# Patient Record
Sex: FEMALE | Race: WHITE | NOT HISPANIC OR LATINO | Employment: FULL TIME | RURAL
[De-identification: names, ages, dates, MRNs, and addresses within clinical notes are randomized per-mention and may not be internally consistent; named-entity substitution may affect disease eponyms.]

---

## 2018-06-27 ENCOUNTER — OFFICE VISIT (OUTPATIENT)
Dept: OTOLARYNGOLOGY | Facility: CLINIC | Age: 33
End: 2018-06-27

## 2018-06-27 VITALS — WEIGHT: 150 LBS | TEMPERATURE: 98.7 F | BODY MASS INDEX: 29.45 KG/M2 | HEIGHT: 60 IN

## 2018-06-27 DIAGNOSIS — H61.22 CERUMEN DEBRIS ON TYMPANIC MEMBRANE OF LEFT EAR: ICD-10-CM

## 2018-06-27 DIAGNOSIS — M26.622 ARTHRALGIA OF LEFT TEMPOROMANDIBULAR JOINT: Primary | ICD-10-CM

## 2018-06-27 PROCEDURE — 99203 OFFICE O/P NEW LOW 30 MIN: CPT | Performed by: OTOLARYNGOLOGY

## 2018-06-27 PROCEDURE — 69210 REMOVE IMPACTED EAR WAX UNI: CPT | Performed by: OTOLARYNGOLOGY

## 2018-06-27 RX ORDER — HYDROCHLOROTHIAZIDE 25 MG/1
TABLET ORAL
COMMUNITY
End: 2022-08-05

## 2018-06-27 RX ORDER — OFLOXACIN 3 MG/ML
4 SOLUTION AURICULAR (OTIC) 2 TIMES DAILY
Qty: 10 ML | Refills: 0 | Status: SHIPPED | OUTPATIENT
Start: 2018-06-27 | End: 2022-08-05

## 2018-06-27 RX ORDER — NAPROXEN SODIUM 220 MG
220 TABLET ORAL EVERY 12 HOURS PRN
Qty: 20 TABLET | Refills: 0 | Status: SHIPPED | OUTPATIENT
Start: 2018-06-27 | End: 2022-08-05

## 2018-06-27 NOTE — PATIENT INSTRUCTIONS

## 2018-06-27 NOTE — PROGRESS NOTES
Subjective   Dalia Fisher is a 32 y.o. female.   Has soreness in front of her ear she originally thought is a tooth problem on the left side  History of Present Illness     She said she placed Orajel didn't seem to help her pain.  Is not having drainage but she uses Q-tips or she feels like the left ear somewhat stopped up is not having bleeding infection she denies dental trauma or recent dental problems denies jaw injury has not had any recent dental work done  She has popping her jaw she opens and closes she denies gritting her teeth  The following portions of the patient's history were reviewed and updated as appropriate: allergies, current medications, past family history, past medical history, past social history, past surgical history and problem list.      Dalia Fisher reports that she has never smoked. She has never used smokeless tobacco. She reports that she does not drink alcohol or use drugs.  Patient is not a tobacco user and has not been counseled for use of tobacco products    Family History   Problem Relation Age of Onset   • Hypertension Mother    • Diabetes Paternal Grandmother    • Heart disease Paternal Grandfather          Current Outpatient Prescriptions:   •  hydrochlorothiazide (HYDRODIURIL) 25 MG tablet, hydrochlorothiazide 25 mg tablet  Take 1 tablet every day by oral route as directed., Disp: , Rfl:   •  levothyroxine (SYNTHROID, LEVOTHROID) 50 MCG tablet, Take 50 mcg by mouth Daily., Disp: , Rfl:   •  naproxen sodium (ALEVE) 220 MG tablet, Take 1 tablet by mouth Every 12 (Twelve) Hours As Needed for Headache., Disp: 20 tablet, Rfl: 0  •  ofloxacin (FLOXIN) 0.3 % otic solution, Administer 4 drops into the left ear 2 (Two) Times a Day. For 1 week, Disp: 10 mL, Rfl: 0    No Known Allergies    Past Medical History:   Diagnosis Date   • Acid reflux    • Allergic rhinitis    • Anxiety    • Disease of thyroid gland    • Hypertension    • Insomnia    • Otalgia    • Ovarian cyst           Review of Systems   Endocrine: Positive for heat intolerance.   Musculoskeletal: Positive for back pain.   Neurological: Positive for weakness, numbness and headaches.   All other systems reviewed and are negative.  Positive for otalgia and left ears stopped up        Objective   Physical Exam   Constitutional: She is oriented to person, place, and time. She appears well-developed and well-nourished.   HENT:   Head: Normocephalic and atraumatic.   Right Ear: Hearing, external ear and ear canal normal.   Left Ear: Hearing, external ear and ear canal normal.   Ears:    Nose: Nose normal. No mucosal edema, rhinorrhea, nasal deformity or septal deviation. No epistaxis. Right sinus exhibits no maxillary sinus tenderness and no frontal sinus tenderness. Left sinus exhibits no maxillary sinus tenderness and no frontal sinus tenderness.   Mouth/Throat: Uvula is midline, oropharynx is clear and moist and mucous membranes are normal. No trismus in the jaw. Normal dentition. No oropharyngeal exudate or posterior oropharyngeal edema. Tonsils are 3+ on the right. Tonsils are 3+ on the left.   Eyes: Conjunctivae are normal.   Neck: Normal range of motion. Neck supple. No JVD present. No tracheal deviation present. No thyromegaly present.   Pulmonary/Chest: Effort normal.   Musculoskeletal: Normal range of motion.   Lymphadenopathy:        Head (right side): No submental, no submandibular, no tonsillar, no preauricular, no posterior auricular and no occipital adenopathy present.        Head (left side): No submental, no submandibular, no tonsillar, no preauricular, no posterior auricular and no occipital adenopathy present.     She has no cervical adenopathy.        Right cervical: No superficial cervical, no deep cervical and no posterior cervical adenopathy present.       Left cervical: No superficial cervical, no deep cervical and no posterior cervical adenopathy present.   Neurological: She is alert and oriented to  person, place, and time. No cranial nerve deficit.   Skin: Skin is warm.   Psychiatric: She has a normal mood and affect. Her speech is normal. Thought content normal.   Nursing note and vitals reviewed.    She is scalloping of her tongue or 10 suggesting she's gritting her teeth   tender the 8 TMJ  Set removal of wax were not tolerated    Assessment/Plan   Dalia was seen today for earache.    Diagnoses and all orders for this visit:    Arthralgia of left temporomandibular joint    Cerumen debris on tympanic membrane of left ear    Other orders  -     ofloxacin (FLOXIN) 0.3 % otic solution; Administer 4 drops into the left ear 2 (Two) Times a Day. For 1 week  -     naproxen sodium (ALEVE) 220 MG tablet; Take 1 tablet by mouth Every 12 (Twelve) Hours As Needed for Headache.         Discussed TMJ problems just a soft diet not chewing gum.  She may need to see her oral surgeon \  in the meantime of 1 to try and get the wax soft and she's not to use Q-tips because she has irritation from the impacted wax or posterior   Floxin drops she'll begin  him peroxide later date to soften that we'll recheck in 2 weeks she'll keep the ear dry in the meantime    consider audioogram and/or tympanogram on follow-up based on how she is doing after wax removal

## 2022-08-05 ENCOUNTER — OFFICE VISIT (OUTPATIENT)
Dept: FAMILY MEDICINE CLINIC | Facility: CLINIC | Age: 37
End: 2022-08-05

## 2022-08-05 VITALS
WEIGHT: 179 LBS | HEIGHT: 59 IN | HEART RATE: 81 BPM | SYSTOLIC BLOOD PRESSURE: 124 MMHG | BODY MASS INDEX: 36.08 KG/M2 | OXYGEN SATURATION: 97 % | DIASTOLIC BLOOD PRESSURE: 85 MMHG

## 2022-08-05 DIAGNOSIS — Z79.899 MEDICATION MANAGEMENT: ICD-10-CM

## 2022-08-05 DIAGNOSIS — G89.29 CHRONIC RIGHT SHOULDER PAIN: ICD-10-CM

## 2022-08-05 DIAGNOSIS — E03.9 HYPOTHYROIDISM, UNSPECIFIED TYPE: Chronic | ICD-10-CM

## 2022-08-05 DIAGNOSIS — Z11.59 NEED FOR HEPATITIS C SCREENING TEST: ICD-10-CM

## 2022-08-05 DIAGNOSIS — Z13.220 SCREENING FOR LIPID DISORDERS: ICD-10-CM

## 2022-08-05 DIAGNOSIS — M25.511 CHRONIC RIGHT SHOULDER PAIN: ICD-10-CM

## 2022-08-05 DIAGNOSIS — K21.9 GASTROESOPHAGEAL REFLUX DISEASE WITHOUT ESOPHAGITIS: Chronic | ICD-10-CM

## 2022-08-05 DIAGNOSIS — R31.9 HEMATURIA, UNSPECIFIED TYPE: ICD-10-CM

## 2022-08-05 DIAGNOSIS — Z13.0 SCREENING FOR DEFICIENCY ANEMIA: ICD-10-CM

## 2022-08-05 DIAGNOSIS — E04.1 THYROID NODULE: ICD-10-CM

## 2022-08-05 DIAGNOSIS — E66.01 CLASS 2 SEVERE OBESITY DUE TO EXCESS CALORIES WITH SERIOUS COMORBIDITY AND BODY MASS INDEX (BMI) OF 36.0 TO 36.9 IN ADULT: ICD-10-CM

## 2022-08-05 DIAGNOSIS — Z76.89 ESTABLISHING CARE WITH NEW DOCTOR, ENCOUNTER FOR: Primary | ICD-10-CM

## 2022-08-05 LAB
AMPHET+METHAMPHET UR QL: POSITIVE
AMPHETAMINE INTERNAL CONTROL: ABNORMAL
AMPHETAMINES UR QL: NEGATIVE
BARBITURATE INTERNAL CONTROL: ABNORMAL
BARBITURATES UR QL SCN: NEGATIVE
BENZODIAZ UR QL SCN: NEGATIVE
BENZODIAZEPINE INTERNAL CONTROL: ABNORMAL
BILIRUB BLD-MCNC: NEGATIVE MG/DL
BUPRENORPHINE INTERNAL CONTROL: ABNORMAL
BUPRENORPHINE SERPL-MCNC: NEGATIVE NG/ML
CANNABINOIDS SERPL QL: NEGATIVE
CLARITY, POC: CLEAR
COCAINE INTERNAL CONTROL: ABNORMAL
COCAINE UR QL: NEGATIVE
COLOR UR: YELLOW
EXPIRATION DATE: ABNORMAL
EXPIRATION DATE: ABNORMAL
GLUCOSE UR STRIP-MCNC: NEGATIVE MG/DL
KETONES UR QL: NEGATIVE
LEUKOCYTE EST, POC: NEGATIVE
Lab: ABNORMAL
Lab: ABNORMAL
MDMA (ECSTASY) INTERNAL CONTROL: ABNORMAL
MDMA UR QL SCN: NEGATIVE
METHADONE INTERNAL CONTROL: ABNORMAL
METHADONE UR QL SCN: NEGATIVE
METHAMPHETAMINE INTERNAL CONTROL: ABNORMAL
NITRITE UR-MCNC: NEGATIVE MG/ML
OPIATES INTERNAL CONTROL: ABNORMAL
OPIATES UR QL: NEGATIVE
OXYCODONE INTERNAL CONTROL: ABNORMAL
OXYCODONE UR QL SCN: NEGATIVE
PCP UR QL SCN: NEGATIVE
PH UR: 6.5 [PH] (ref 5–8)
PHENCYCLIDINE INTERNAL CONTROL: ABNORMAL
PROT UR STRIP-MCNC: NEGATIVE MG/DL
RBC # UR STRIP: ABNORMAL /UL
SP GR UR: 1.01 (ref 1–1.03)
THC INTERNAL CONTROL: ABNORMAL
UROBILINOGEN UR QL: NORMAL

## 2022-08-05 PROCEDURE — 81003 URINALYSIS AUTO W/O SCOPE: CPT | Performed by: PHYSICIAN ASSISTANT

## 2022-08-05 PROCEDURE — 80305 DRUG TEST PRSMV DIR OPT OBS: CPT | Performed by: PHYSICIAN ASSISTANT

## 2022-08-05 PROCEDURE — 87086 URINE CULTURE/COLONY COUNT: CPT | Performed by: PHYSICIAN ASSISTANT

## 2022-08-05 PROCEDURE — 99204 OFFICE O/P NEW MOD 45 MIN: CPT | Performed by: PHYSICIAN ASSISTANT

## 2022-08-05 RX ORDER — LEVOTHYROXINE SODIUM 0.05 MG/1
50 TABLET ORAL DAILY
Qty: 90 TABLET | Refills: 0 | Status: SHIPPED | OUTPATIENT
Start: 2022-08-05 | End: 2022-11-04 | Stop reason: SDUPTHER

## 2022-08-05 RX ORDER — FAMOTIDINE 20 MG/1
20 TABLET, FILM COATED ORAL
Qty: 30 TABLET | Refills: 0 | Status: SHIPPED | OUTPATIENT
Start: 2022-08-05 | End: 2022-09-19

## 2022-08-05 RX ORDER — GABAPENTIN 300 MG/1
300 CAPSULE ORAL 2 TIMES DAILY
COMMUNITY
End: 2022-08-07 | Stop reason: SDUPTHER

## 2022-08-05 NOTE — PATIENT INSTRUCTIONS
Patient was educated/instructed on their diagnosis, treatment and medications prior to discharge from the clinic today. Patient advised to call the office with any questions or concerns.

## 2022-08-05 NOTE — PROGRESS NOTES
Chief Complaint  Chief Complaint   Patient presents with   • Establish Care   • Hypothyroidism   • chronic pain       Subjective          Dalia Fisher presents to [facility]  History of Present Illness     Patient is here today as a new patient to establish care. Previous PCP is listed as Dr Jodie Moore in [place], last seen in 2022. Patient moved to [place] in June 2022.     Thyroid nodule: Patient had a thyroid ultrasound on 5- which showed a 4.4 mm benign hypoechoic lesion.  Patient states that she was instructed to follow-up in 6 months.    Hypothyroidism: Patient was diagnosed in 2014 with hypothyroidism.  Patient states that she has been out of her levothyroxine 50 MCG daily for about 2 weeks.  Patient states that she has difficulty losing weight.  States that she recently started phentermine on Monday from a weight loss clinic in [facility].  Patient states that she is unsure of how to monitor her nutrition; she states that she does walk on occasion but is in the process of moving and is currently not doing formal exercise.    Chronic right shoulder pain: Patient states that she is on gabapentin 300 mg twice daily for chronic right shoulder pain she states that this works very well for her however has been out for the past 2 weeks.  She had an MRI of the cervical spine on December 23, 2021.  That test has been reviewed today.  It shows minimal bulging of the disc at C6-7 without spinal stenosis or foraminal narrowing.    GERD: Patient states that she only takes Tums as needed for GERD however she has noted neck fullness and a raspy voice noted in the morning with occasional sore throat.    Hematuria: Patient states that during a recent work physical it was noted that she had hematuria with no further work-up.  She would like to have it checked again today.  She has no urinary symptoms.    Patient states that she would like to get back on two medications,  "Levothyroxine and Gabapentin.     Medical History: has a past medical history of Acid reflux, Allergic rhinitis, Anxiety, Disease of thyroid gland, Hypertension, Insomnia, Otalgia, and Ovarian cyst.   Surgical History: has a past surgical history that includes  section; Carpal tunnel release; and Ectopic pregnancy.   Family History: family history includes Diabetes in her paternal grandmother; Heart disease in her paternal grandfather; Hypertension in her mother.   Social History: reports that she has never smoked. She has never used smokeless tobacco. She reports that she does not drink alcohol and does not use drugs.    Allergies: Patient has no known allergies.    Health Maintenance Due   Topic Date Due   • ANNUAL PHYSICAL  Never done   • HEPATITIS C SCREENING  Never done       Immunization History   Administered Date(s) Administered   • COVID-19 (PFIZER) PURPLE CAP 2021, 2021   • FluLaval/Fluarix/Fluzone >6 12/15/2017       Objective     Vitals:    22 0917   BP: 124/85   Pulse: 81   SpO2: 97%   Weight: 81.2 kg (179 lb)   Height: 149.9 cm (59\")     Body mass index is 36.15 kg/m².     Wt Readings from Last 3 Encounters:   22 81.2 kg (179 lb)   18 68 kg (150 lb)     BP Readings from Last 3 Encounters:   22 124/85       Class 2 Severe Obesity (BMI >=35 and <=39.9). Obesity-related health conditions include the following: GERD. Obesity is newly identified. BMI is is above average; BMI management plan is completed. We discussed portion control and increasing exercise.      Patient Care Team:  Jelena Melara PA as PCP - General (Family Medicine)    Physical Exam  Vitals and nursing note reviewed.   Constitutional:       Appearance: Normal appearance. She is obese.   HENT:      Head: Normocephalic and atraumatic.   Neck:      Vascular: No carotid bruit.      Comments: Thyroid : gland size normal, nontender, no nodules or masses present on palpation   Cardiovascular: "      Rate and Rhythm: Normal rate and regular rhythm.      Pulses: Normal pulses.      Heart sounds: Normal heart sounds.   Pulmonary:      Effort: Pulmonary effort is normal.      Breath sounds: Normal breath sounds.   Musculoskeletal:      Cervical back: Neck supple. No tenderness.      Right lower leg: No edema.      Left lower leg: No edema.   Lymphadenopathy:      Cervical: No cervical adenopathy.   Neurological:      Mental Status: She is alert.   Psychiatric:         Mood and Affect: Mood normal.         Behavior: Behavior normal.           Result Review :                           Assessment and Plan      Diagnoses and all orders for this visit:    1. Establishing care with new doctor, encounter for (Primary)    2. Hypothyroidism, unspecified type  Comments:  Unsure of stability: Will restart levothyroxine at 50 MCG daily and recheck labs in about a month.  Orders:  -     levothyroxine (SYNTHROID, LEVOTHROID) 50 MCG tablet; Take 1 tablet by mouth Daily.  Dispense: 90 tablet; Refill: 0  -     TSH; Future  -     T4, Free; Future    3. Gastroesophageal reflux disease without esophagitis  Comments:  Not stable: We will start Pepcid 20 mg once daily with evening meal for about a month to see if symptoms resolve.  Orders:  -     famotidine (Pepcid) 20 MG tablet; Take 1 tablet by mouth Daily Before Supper.  Dispense: 30 tablet; Refill: 0    4. Need for hepatitis C screening test  -     Hepatitis C Antibody; Future    5. Screening for lipid disorders  -     Comprehensive Metabolic Panel; Future  -     Lipid Panel; Future    6. Screening for deficiency anemia  -     CBC & Differential; Future    7. Medication management  Comments:  Mario JULIAN obtained and reviewed.  Narcotic consent signed.  Orders:  -     POC Urine Drug Screen [facility]    8. Hematuria, unspecified type  Comments:  We will check UA to further evaluate.  Orders:  -     POCT urinalysis dipstick, automated  -     Urine Culture - Urine, Urine,  Clean Catch; Future  -     Urine Culture - Urine, Urine, Clean Catch    9. Class 2 severe obesity due to excess calories with serious comorbidity and body mass index (BMI) of 36.0 to 36.9 in adult (HCC)  Comments:  Discussed diet and nutrition and made an appt to discuss nutrition further.    10. Thyroid nodule  Comments:  Patient to have recheck thyroid u/s in 12 mths from orginal.    11. Chronic right shoulder pain  Comments:  Start gabapentin 300 mg twice daily.  UDS plan #for review.  Controlled medication consent signed by patient.  Orders:  -     gabapentin (NEURONTIN) 300 MG capsule; Take 1 capsule by mouth 2 (Two) Times a Day.  Dispense: 60 capsule; Refill: 2            Follow Up     Return in about 3 months (around 11/5/2022).    Patient was given instructions and counseling regarding her condition or for health maintenance advice. Please see specific information pulled into the AVS if appropriate.

## 2022-08-06 LAB — BACTERIA SPEC AEROBE CULT: NO GROWTH

## 2022-08-07 RX ORDER — GABAPENTIN 300 MG/1
300 CAPSULE ORAL 2 TIMES DAILY
Qty: 60 CAPSULE | Refills: 2 | Status: SHIPPED | OUTPATIENT
Start: 2022-08-07 | End: 2023-03-17 | Stop reason: SDUPTHER

## 2022-08-19 ENCOUNTER — PATIENT ROUNDING (BHMG ONLY) (OUTPATIENT)
Dept: FAMILY MEDICINE CLINIC | Facility: CLINIC | Age: 37
End: 2022-08-19

## 2022-08-19 NOTE — PROGRESS NOTES
A [employer] message has been sent to the patient for PATIENT ROUNDING with [facility].

## 2022-09-16 DIAGNOSIS — K21.9 GASTROESOPHAGEAL REFLUX DISEASE WITHOUT ESOPHAGITIS: Chronic | ICD-10-CM

## 2022-09-19 RX ORDER — FAMOTIDINE 20 MG/1
TABLET, FILM COATED ORAL
Qty: 30 TABLET | Refills: 0 | Status: SHIPPED | OUTPATIENT
Start: 2022-09-19 | End: 2022-11-04 | Stop reason: SDUPTHER

## 2022-09-23 ENCOUNTER — OFFICE VISIT (OUTPATIENT)
Dept: FAMILY MEDICINE CLINIC | Facility: CLINIC | Age: 37
End: 2022-09-23

## 2022-09-23 VITALS
DIASTOLIC BLOOD PRESSURE: 82 MMHG | SYSTOLIC BLOOD PRESSURE: 120 MMHG | WEIGHT: 179.2 LBS | HEIGHT: 59 IN | BODY MASS INDEX: 36.12 KG/M2 | HEART RATE: 81 BPM | OXYGEN SATURATION: 100 % | TEMPERATURE: 97.9 F

## 2022-09-23 DIAGNOSIS — E03.9 HYPOTHYROIDISM, UNSPECIFIED TYPE: ICD-10-CM

## 2022-09-23 DIAGNOSIS — Z76.89 ESTABLISHING CARE WITH NEW DOCTOR, ENCOUNTER FOR: Primary | ICD-10-CM

## 2022-09-23 DIAGNOSIS — M25.511 CHRONIC RIGHT SHOULDER PAIN: ICD-10-CM

## 2022-09-23 DIAGNOSIS — K21.9 GASTROESOPHAGEAL REFLUX DISEASE WITHOUT ESOPHAGITIS: ICD-10-CM

## 2022-09-23 DIAGNOSIS — Z11.59 NEED FOR HEPATITIS C SCREENING TEST: ICD-10-CM

## 2022-09-23 DIAGNOSIS — G89.29 CHRONIC RIGHT SHOULDER PAIN: ICD-10-CM

## 2022-09-23 DIAGNOSIS — S14.3XXS INJURY OF BRACHIAL PLEXUS, SEQUELA: ICD-10-CM

## 2022-09-23 DIAGNOSIS — E66.01 CLASS 2 SEVERE OBESITY DUE TO EXCESS CALORIES WITH SERIOUS COMORBIDITY AND BODY MASS INDEX (BMI) OF 36.0 TO 36.9 IN ADULT: ICD-10-CM

## 2022-09-23 DIAGNOSIS — Z23 NEED FOR INFLUENZA VACCINATION: ICD-10-CM

## 2022-09-23 PROBLEM — E66.812 CLASS 2 SEVERE OBESITY DUE TO EXCESS CALORIES WITH SERIOUS COMORBIDITY AND BODY MASS INDEX (BMI) OF 36.0 TO 36.9 IN ADULT: Status: ACTIVE | Noted: 2022-09-23

## 2022-09-23 LAB
ALBUMIN SERPL-MCNC: 4.9 G/DL (ref 3.5–5.2)
ALBUMIN/GLOB SERPL: 1.9 G/DL
ALP SERPL-CCNC: 84 U/L (ref 39–117)
ALT SERPL W P-5'-P-CCNC: 29 U/L (ref 1–33)
ANION GAP SERPL CALCULATED.3IONS-SCNC: 10 MMOL/L (ref 5–15)
AST SERPL-CCNC: 26 U/L (ref 1–32)
BASOPHILS # BLD AUTO: 0.03 10*3/MM3 (ref 0–0.2)
BASOPHILS NFR BLD AUTO: 0.3 % (ref 0–1.5)
BILIRUB SERPL-MCNC: 0.4 MG/DL (ref 0–1.2)
BUN SERPL-MCNC: 14 MG/DL (ref 6–20)
BUN/CREAT SERPL: 23.7 (ref 7–25)
CALCIUM SPEC-SCNC: 9.8 MG/DL (ref 8.6–10.5)
CHLORIDE SERPL-SCNC: 102 MMOL/L (ref 98–107)
CHOLEST SERPL-MCNC: 240 MG/DL (ref 0–200)
CO2 SERPL-SCNC: 24 MMOL/L (ref 22–29)
CREAT SERPL-MCNC: 0.59 MG/DL (ref 0.57–1)
DEPRECATED RDW RBC AUTO: 43.1 FL (ref 37–54)
EGFRCR SERPLBLD CKD-EPI 2021: 118.5 ML/MIN/1.73
EOSINOPHIL # BLD AUTO: 0.19 10*3/MM3 (ref 0–0.4)
EOSINOPHIL NFR BLD AUTO: 2.1 % (ref 0.3–6.2)
ERYTHROCYTE [DISTWIDTH] IN BLOOD BY AUTOMATED COUNT: 14.3 % (ref 12.3–15.4)
GLOBULIN UR ELPH-MCNC: 2.6 GM/DL
GLUCOSE SERPL-MCNC: 79 MG/DL (ref 65–99)
HCT VFR BLD AUTO: 41.4 % (ref 34–46.6)
HCV AB SER DONR QL: NORMAL
HDLC SERPL-MCNC: 46 MG/DL (ref 40–60)
HGB BLD-MCNC: 13.5 G/DL (ref 12–15.9)
IMM GRANULOCYTES # BLD AUTO: 0.03 10*3/MM3 (ref 0–0.05)
IMM GRANULOCYTES NFR BLD AUTO: 0.3 % (ref 0–0.5)
LDLC SERPL CALC-MCNC: 166 MG/DL (ref 0–100)
LDLC/HDLC SERPL: 3.56 {RATIO}
LYMPHOCYTES # BLD AUTO: 2.26 10*3/MM3 (ref 0.7–3.1)
LYMPHOCYTES NFR BLD AUTO: 24.8 % (ref 19.6–45.3)
MCH RBC QN AUTO: 27.4 PG (ref 26.6–33)
MCHC RBC AUTO-ENTMCNC: 32.6 G/DL (ref 31.5–35.7)
MCV RBC AUTO: 84 FL (ref 79–97)
MONOCYTES # BLD AUTO: 0.64 10*3/MM3 (ref 0.1–0.9)
MONOCYTES NFR BLD AUTO: 7 % (ref 5–12)
NEUTROPHILS NFR BLD AUTO: 5.97 10*3/MM3 (ref 1.7–7)
NEUTROPHILS NFR BLD AUTO: 65.5 % (ref 42.7–76)
NRBC BLD AUTO-RTO: 0 /100 WBC (ref 0–0.2)
PLATELET # BLD AUTO: 386 10*3/MM3 (ref 140–450)
PMV BLD AUTO: 9.8 FL (ref 6–12)
POTASSIUM SERPL-SCNC: 3.8 MMOL/L (ref 3.5–5.2)
PROT SERPL-MCNC: 7.5 G/DL (ref 6–8.5)
RBC # BLD AUTO: 4.93 10*6/MM3 (ref 3.77–5.28)
SODIUM SERPL-SCNC: 136 MMOL/L (ref 136–145)
T-UPTAKE NFR SERPL: 1.01 TBI (ref 0.8–1.3)
T4 SERPL-MCNC: 9.57 MCG/DL (ref 4.5–11.7)
TRIGL SERPL-MCNC: 152 MG/DL (ref 0–150)
TSH SERPL DL<=0.05 MIU/L-ACNC: 4.49 UIU/ML (ref 0.27–4.2)
VLDLC SERPL-MCNC: 28 MG/DL (ref 5–40)
WBC NRBC COR # BLD: 9.12 10*3/MM3 (ref 3.4–10.8)

## 2022-09-23 PROCEDURE — 86803 HEPATITIS C AB TEST: CPT

## 2022-09-23 PROCEDURE — 84479 ASSAY OF THYROID (T3 OR T4): CPT

## 2022-09-23 PROCEDURE — 80050 GENERAL HEALTH PANEL: CPT

## 2022-09-23 PROCEDURE — 84436 ASSAY OF TOTAL THYROXINE: CPT

## 2022-09-23 PROCEDURE — 99204 OFFICE O/P NEW MOD 45 MIN: CPT

## 2022-09-23 PROCEDURE — 80061 LIPID PANEL: CPT

## 2022-09-23 NOTE — PROGRESS NOTES
Chief Complaint  Chief Complaint   Patient presents with   • Establish Care   • Shoulder Pain       Subjective          Dalia NINA presents to [facility]  History of Present Illness  Patient presents today to establish care.  She had recently seen a PCP in the [facility] area but she prefers to have a provider closer to home in [place].  When she last saw this provider a little over a month ago lab work was ordered to be done in the future after she had restarted her levothyroxine. This lab work was never done, so it will be completed this visit.    Patient moved here from [place] within the last year or so.  She states that she had an injury to her brachial plexus in November 2021 resulting in chronic right shoulder pain.  She was seeing a neurologist in [place] who was providing treatments. She would like to see someone locally to continue these treatments to help her with this ongoing issue. She continues to have pain and decreased strength to right arm. Currently takes Gabapentin 300 mg, uses massage gun at home. She had an MRI of the cervical spine on December 23, 2021.  It shows minimal bulging of the disc at C6-7 without spinal stenosis or foraminal narrowing.    Patient reports frustration with 20 lb weight gain since delivery of her youngest son in 2019.  She has tried losing weight through diet and exercise but has not been successful.  She does have a history of thyroid disorder and very recently began taking levothyroxine again. Routine labwork is due.     Patient had a thyroid ultrasound on 5- which showed a 4.4 mm benign hypoechoic lesion.  Patient states that she was instructed to follow-up in 6 months. She will need a repeat thyroid ultrasound.    Objective     Medical History:  Past Medical History:   Diagnosis Date   • Acid reflux    • Allergic rhinitis    • Anxiety    • Disease of thyroid gland    • Hypertension    • Insomnia    • Otalgia    • Ovarian cyst   "    Past Surgical History:   Procedure Laterality Date   • CARPAL TUNNEL RELEASE     •  SECTION     • ECTOPIC PREGNANCY        Social History     Tobacco Use   • Smoking status: Never Smoker   • Smokeless tobacco: Never Used   Vaping Use   • Vaping Use: Never used   Substance Use Topics   • Alcohol use: No   • Drug use: No     Family History   Problem Relation Age of Onset   • Hypertension Mother    • Diabetes Paternal Grandmother    • Heart disease Paternal Grandfather        Medications:  Prior to Admission medications    Medication Sig Start Date End Date Taking? Authorizing Provider   diclofenac (VOLTAREN) 75 MG EC tablet Take 1 tablet by mouth 2 (Two) Times a Day. 9/10/22  Yes Bambi Muñoz MD   famotidine (PEPCID) 20 MG tablet TAKE 1 TABLET BY MOUTH ONCE DAILY BEFORE SUPPER 22  Yes Jelena Melara PA   gabapentin (NEURONTIN) 300 MG capsule Take 1 capsule by mouth 2 (Two) Times a Day. 22  Yes Jelena Melara PA   levothyroxine (SYNTHROID, LEVOTHROID) 50 MCG tablet Take 1 tablet by mouth Daily. 22  Yes Jelena Melara PA   methocarbamol (ROBAXIN) 500 MG tablet Take 2 tablets by mouth 4 (Four) Times a Day. 9/10/22   Bambi Muñoz MD   predniSONE (DELTASONE) 20 MG tablet Take 2 tablets by mouth Daily. 9/10/22 9/23/22  Bambi Muñoz MD        Allergies:   Patient has no known allergies.    Health Maintenance Due   Topic Date Due   • ANNUAL PHYSICAL  Never done   • HEPATITIS C SCREENING  Never done   • COVID-19 Vaccine (3 - Booster for Pfizer series) 10/11/2021           Vital Signs:     /82   Pulse 81   Temp 97.9 °F (36.6 °C)   Ht 149.9 cm (59\")   Wt 81.3 kg (179 lb 3.2 oz)   SpO2 100%   BMI 36.19 kg/m²       Physical Exam  Vitals reviewed.   Constitutional:       Appearance: Normal appearance. She is well-developed. She is obese.   HENT:      Head: Normocephalic and atraumatic.      Right Ear: External ear normal.      Left Ear: External ear normal.    "   Mouth/Throat:      Pharynx: No oropharyngeal exudate.   Eyes:      Conjunctiva/sclera: Conjunctivae normal.      Pupils: Pupils are equal, round, and reactive to light.   Cardiovascular:      Rate and Rhythm: Normal rate and regular rhythm.      Heart sounds: No murmur heard.    No friction rub. No gallop.   Pulmonary:      Effort: Pulmonary effort is normal.      Breath sounds: Normal breath sounds. No wheezing or rhonchi.   Abdominal:      General: Bowel sounds are normal. There is no distension.      Palpations: Abdomen is soft.      Tenderness: There is no abdominal tenderness.   Musculoskeletal:      Right shoulder: Tenderness present. Decreased range of motion. Decreased strength.      Right upper arm: Tenderness present.   Skin:     General: Skin is warm and dry.   Neurological:      Mental Status: She is alert and oriented to person, place, and time.      Cranial Nerves: No cranial nerve deficit.   Psychiatric:         Mood and Affect: Mood and affect normal.         Behavior: Behavior normal.         Thought Content: Thought content normal.         Judgment: Judgment normal.          Result Review :                   Assessment and Plan    Diagnoses and all orders for this visit:    1. Establishing care with new doctor, encounter for (Primary)  -     CBC & Differential  -     Comprehensive Metabolic Panel  -     Lipid Panel    2. Chronic right shoulder pain  -     Cancel: Ambulatory Referral to Neurology  -     Ambulatory Referral to Physical Therapy Evaluate and treat; Electrotherapy    3. Class 2 severe obesity due to excess calories with serious comorbidity and body mass index (BMI) of 36.0 to 36.9 in adult (HCC)  Assessment & Plan:  Patient's (Body mass index is 36.19 kg/m².) indicates that they are obese (BMI >30) with health conditions that include none . Weight is newly identified. BMI is is above average; BMI management plan is completed. We discussed portion control and increasing exercise.        4. Gastroesophageal reflux disease without esophagitis  Assessment & Plan:  Continue taking Pepcid 20 mg daily      5. Need for hepatitis C screening test  -     Hepatitis C antibody    6. Need for influenza vaccination  -     FluLaval/Fluzone >6 mos ([id number])    7. Injury of brachial plexus, sequela  -     Ambulatory Referral to Physical Therapy Evaluate and treat; Electrotherapy    8. Hypothyroidism, unspecified type  -     Thyroid Panel With TSH     \plain    Patient will be referred to physical therapy for evaluation and treatment for chronic shoulder pain. She may be referred to pain management for continuation of Gabapentin. Thyroid ultrasound will be due to be repeated after November 2022. Encouraged patient to rule out any underlying cause of inability to lose weight prior to beginning any new medications for weight loss.     Smoking Cessation:    Dalia NINA  reports that she has never smoked. She has never used smokeless tobacco.          Follow Up   Return in about 3 months (around 12/23/2022) for Recheck.  Patient was given instructions and counseling regarding her condition or for health maintenance advice. Please see specific information pulled into the AVS if appropriate.

## 2022-09-23 NOTE — ASSESSMENT & PLAN NOTE
Patient's (Body mass index is 36.19 kg/m².) indicates that they are obese (BMI >30) with health conditions that include none . Weight is newly identified. BMI is is above average; BMI management plan is completed. We discussed portion control and increasing exercise.

## 2022-09-26 DIAGNOSIS — Z79.899 MEDICATION MANAGEMENT: ICD-10-CM

## 2022-09-26 DIAGNOSIS — S14.3XXS INJURY OF BRACHIAL PLEXUS, SEQUELA: ICD-10-CM

## 2022-09-26 DIAGNOSIS — G89.29 CHRONIC RIGHT SHOULDER PAIN: Primary | ICD-10-CM

## 2022-09-26 DIAGNOSIS — M25.511 CHRONIC RIGHT SHOULDER PAIN: Primary | ICD-10-CM

## 2022-09-27 ENCOUNTER — TELEPHONE (OUTPATIENT)
Dept: FAMILY MEDICINE CLINIC | Facility: CLINIC | Age: 37
End: 2022-09-27

## 2022-09-27 NOTE — TELEPHONE ENCOUNTER
Caller: Dalia NINA    Relationship: Self    Best call back number: [phone]    Caller requesting test results: PATIENT    What test was performed:LABS    When was the test performed: FRIDAY    Where was the test performed: IN OFFICE    Additional notes: PATIENT WOULD LIKE TO KNOW THE RESULTS OF HER LABS.

## 2022-09-28 NOTE — TELEPHONE ENCOUNTER
Caller: Dalia NINA    Relationship: Self    Best call back number: [phone]     What is the best time to reach you: ANY    Who are you requesting to speak with (clinical staff, provider,  specific staff member): CLINICAL STAFF    What was the call regarding: PATIENT CALLED BACK REGARDING HER TEST RESULTS. SHE WOULD LIKE TO SPEAK TO A NURSE ABOUT THOSE RESULTS.    Do you require a callback: YES

## 2022-11-04 ENCOUNTER — OFFICE VISIT (OUTPATIENT)
Dept: FAMILY MEDICINE CLINIC | Facility: CLINIC | Age: 37
End: 2022-11-04

## 2022-11-04 VITALS
TEMPERATURE: 97.4 F | BODY MASS INDEX: 36 KG/M2 | SYSTOLIC BLOOD PRESSURE: 132 MMHG | HEIGHT: 59 IN | OXYGEN SATURATION: 99 % | DIASTOLIC BLOOD PRESSURE: 94 MMHG | HEART RATE: 96 BPM | WEIGHT: 178.6 LBS

## 2022-11-04 DIAGNOSIS — Z23 NEED FOR INFLUENZA VACCINATION: ICD-10-CM

## 2022-11-04 DIAGNOSIS — E66.01 CLASS 2 SEVERE OBESITY DUE TO EXCESS CALORIES WITH SERIOUS COMORBIDITY AND BODY MASS INDEX (BMI) OF 36.0 TO 36.9 IN ADULT: ICD-10-CM

## 2022-11-04 DIAGNOSIS — K21.9 GASTROESOPHAGEAL REFLUX DISEASE WITHOUT ESOPHAGITIS: Chronic | ICD-10-CM

## 2022-11-04 DIAGNOSIS — Z86.39 HX OF THYROID NODULE: Primary | ICD-10-CM

## 2022-11-04 DIAGNOSIS — E03.9 HYPOTHYROIDISM, UNSPECIFIED TYPE: Chronic | ICD-10-CM

## 2022-11-04 PROCEDURE — 90471 IMMUNIZATION ADMIN: CPT

## 2022-11-04 PROCEDURE — 90686 IIV4 VACC NO PRSV 0.5 ML IM: CPT

## 2022-11-04 PROCEDURE — 99213 OFFICE O/P EST LOW 20 MIN: CPT

## 2022-11-04 RX ORDER — FAMOTIDINE 20 MG/1
20 TABLET, FILM COATED ORAL DAILY
Qty: 30 TABLET | Refills: 2 | Status: SHIPPED | OUTPATIENT
Start: 2022-11-04 | End: 2023-01-30

## 2022-11-04 RX ORDER — LEVOTHYROXINE SODIUM 0.05 MG/1
50 TABLET ORAL DAILY
Qty: 90 TABLET | Refills: 0 | Status: SHIPPED | OUTPATIENT
Start: 2022-11-04 | End: 2023-01-30

## 2022-11-04 NOTE — ASSESSMENT & PLAN NOTE
Addended by: LAILA CHAPPELL on: 11/1/2019 05:20 PM     Modules accepted: Orders     Patient's (Body mass index is 36.07 kg/m².) indicates that they are obese (BMI >30) with health conditions that include none . Weight is improving with lifestyle modifications. BMI is is above average; BMI management plan is completed. We discussed portion control, increasing exercise, an emily-based approach such as Nantero Pal or Lose It and Referred to weight loss clinic.

## 2022-11-04 NOTE — PROGRESS NOTES
Chief Complaint  Chief Complaint   Patient presents with   • Weight Loss   • Med Refill     Levothyroxine   Pepcid        Subjective      Dalia NINA presents to [facility]  History of Present Illness  Patient presents today to discuss weight loss.  She has been trying to follow a healthy diet with increased fresh fruits and vegetables as well as increasing her water intake.  She has cut out all sodas.  She has  been exercising and trying to be more physically active than previously.  She feels that she is having difficulty losing weight despite all these changes.  She has lost 7 pounds since she was last seen in September.    She had previously been off of her thyroid medication and was restarted on levothyroxine 50 mcg daily.  She reports that she has been taking that regularly.  Labs will be rechecked at her next appointment on 2022.    Patient has a history of thyroid nodules which were found via thyroid ultrasound in May of this year.  They advised that she have the ultrasound repeated in 6 months to monitor the nodules for any changes or growth in size.  She does report a family history of thyroid disease and a cousin who had thyroid cancer and subsequent thyroidectomy.    Objective     Medical History:  Past Medical History:   Diagnosis Date   • Acid reflux    • Allergic rhinitis    • Anxiety    • Disease of thyroid gland    • Hypertension    • Insomnia    • Otalgia    • Ovarian cyst      Past Surgical History:   Procedure Laterality Date   • CARPAL TUNNEL RELEASE     •  SECTION     • ECTOPIC PREGNANCY        Social History     Tobacco Use   • Smoking status: Never   • Smokeless tobacco: Never   Vaping Use   • Vaping Use: Never used   Substance Use Topics   • Alcohol use: No   • Drug use: No     Family History   Problem Relation Age of Onset   • Hypertension Mother    • Diabetes Paternal Grandmother    • Heart disease Paternal Grandfather   "      Medications:  Prior to Admission medications    Medication Sig Start Date End Date Taking? Authorizing Provider   famotidine (PEPCID) 20 MG tablet TAKE 1 TABLET BY MOUTH ONCE DAILY BEFORE SUPPER 9/19/22  Yes Jelena Melara PA   gabapentin (NEURONTIN) 300 MG capsule Take 1 capsule by mouth 2 (Two) Times a Day. 8/7/22  Yes Jelena Melara PA   levothyroxine (SYNTHROID, LEVOTHROID) 50 MCG tablet Take 1 tablet by mouth Daily. 8/5/22  Yes Jelena Melara PA   diclofenac (VOLTAREN) 75 MG EC tablet Take 1 tablet by mouth 2 (Two) Times a Day. 9/10/22   Bambi Muñoz MD        Allergies:   Patient has no known allergies.    Health Maintenance Due   Topic Date Due   • ANNUAL PHYSICAL  Never done   • COVID-19 Vaccine (3 - Booster for Pfizer series) 10/11/2021         Vital Signs:   /94   Pulse 96   Temp 97.4 °F (36.3 °C)   Ht 149.9 cm (59\")   Wt 81 kg (178 lb 9.6 oz)   SpO2 99%   BMI 36.07 kg/m²     Wt Readings from Last 3 Encounters:   11/04/22 81 kg (178 lb 9.6 oz)   09/23/22 81.3 kg (179 lb 3.2 oz)   09/10/22 83.9 kg (185 lb)     BP Readings from Last 3 Encounters:   11/04/22 132/94   09/23/22 120/82   09/10/22 148/97       Class 2 Severe Obesity (BMI >=35 and <=39.9). Obesity-related health conditions include the following: none. Obesity is improving with lifestyle modifications. BMI is is above average; BMI management plan is completed. We discussed portion control, increasing exercise and an emily-based approach such as LCO Creation Pal or Lose It.       Physical Exam  Vitals reviewed.   Constitutional:       Appearance: Normal appearance. She is well-developed. She is obese.   HENT:      Head: Normocephalic and atraumatic.      Right Ear: External ear normal.      Left Ear: External ear normal.      Mouth/Throat:      Pharynx: No oropharyngeal exudate.   Eyes:      Conjunctiva/sclera: Conjunctivae normal.      Pupils: Pupils are equal, round, and reactive to light. "   Cardiovascular:      Rate and Rhythm: Normal rate and regular rhythm.      Heart sounds: No murmur heard.    No friction rub. No gallop.   Pulmonary:      Effort: Pulmonary effort is normal.      Breath sounds: Normal breath sounds. No wheezing or rhonchi.   Abdominal:      General: Bowel sounds are normal. There is no distension.      Palpations: Abdomen is soft.      Tenderness: There is no abdominal tenderness.   Skin:     General: Skin is warm and dry.   Neurological:      Mental Status: She is alert and oriented to person, place, and time.      Cranial Nerves: No cranial nerve deficit.   Psychiatric:         Mood and Affect: Mood and affect normal.         Behavior: Behavior normal.         Thought Content: Thought content normal.         Judgment: Judgment normal.          Result Review :    The following data was reviewed by SUSANNE Cline on 11/04/22 at 16:51 EDT:    Common labs    Common Labs 9/23/22 9/23/22 9/23/22    1357 1357 1357   Glucose  79    BUN  14    Creatinine  0.59    Sodium  136    Potassium  3.8    Chloride  102    Calcium  9.8    Albumin  4.90    Total Bilirubin  0.4    Alkaline Phosphatase  84    AST (SGOT)  26    ALT (SGPT)  29    WBC 9.12     Hemoglobin 13.5     Hematocrit 41.4     Platelets 386     Total Cholesterol   240 (A)   Triglycerides   152 (A)   HDL Cholesterol   46   LDL Cholesterol    166 (A)   (A) Abnormal value            TSH    TSH 9/23/22   TSH 4.490 (A)   (A) Abnormal value              No Images in the past 120 days found..    Data reviewed: Radiologic studies Reviewed scanned thyroid ultrasound from previous provider that was done in May 2022 with suggestion of repeating in 6 months.              Assessment and Plan    Diagnoses and all orders for this visit:    1. Hx of thyroid nodule (Primary)  -     US Thyroid    2. Gastroesophageal reflux disease without esophagitis  Comments:  Continue Pepcid 20 mg once daily.  Orders:  -     famotidine (PEPCID) 20 MG  tablet; Take 1 tablet by mouth Daily.  Dispense: 30 tablet; Refill: 2    3. Hypothyroidism, unspecified type  Comments:  Continue levothyroxine at 50 MCG daily and will recheck labs in about a month at her next visit.  Orders:  -     levothyroxine (SYNTHROID, LEVOTHROID) 50 MCG tablet; Take 1 tablet by mouth Daily.  Dispense: 90 tablet; Refill: 0    4. Need for influenza vaccination  -     FluLaval/Fluzone >6 mos (9794-3035)    5. Class 2 severe obesity due to excess calories with serious comorbidity and body mass index (BMI) of 36.0 to 36.9 in adult (HCC)  Assessment & Plan:  Patient's (Body mass index is 36.07 kg/m².) indicates that they are obese (BMI >30) with health conditions that include none . Weight is improving with lifestyle modifications. BMI is is above average; BMI management plan is completed. We discussed portion control, increasing exercise, an emily-based approach such as MyFitness Pal or Lose It and Referred to weight loss clinic.        Patient will have thyroid ultrasound repeated to monitor her thyroid nodules for any growth or changes.  She was referred to local weight loss clinics if she chooses to pursue medication or assisted weight loss treatment plan aside from her successful attempt of diet and exercise which has resulted in weight loss of 7 pounds.      Smoking Cessation:    Dalia NINA  reports that she has never smoked. She has never used smokeless tobacco.          Follow Up   No follow-ups on file.  Patient was given instructions and counseling regarding her condition or for health maintenance advice. Please see specific information pulled into the AVS if appropriate.     Please note that portions of this note were completed with a voice recognition program.    Answers for HPI/ROS submitted by the patient on 11/2/2022  Please describe your symptoms.: I just feel I'm gaining weight even with my diet changing that I did  Have you had these symptoms before?: Yes  How long have you  been having these symptoms?: Greater than 2 weeks  Please list any medications you are currently taking for this condition.: Gabepentin , Oxycodone , Levothyroxine  What is the primary reason for your visit?: Other

## 2022-11-08 ENCOUNTER — TREATMENT (OUTPATIENT)
Dept: PHYSICAL THERAPY | Facility: CLINIC | Age: 37
End: 2022-11-08

## 2022-11-08 DIAGNOSIS — M75.41 IMPINGEMENT SYNDROME OF SHOULDER REGION, RIGHT: ICD-10-CM

## 2022-11-08 DIAGNOSIS — G89.29 CHRONIC RIGHT SHOULDER PAIN: Primary | ICD-10-CM

## 2022-11-08 DIAGNOSIS — M25.511 CHRONIC RIGHT SHOULDER PAIN: Primary | ICD-10-CM

## 2022-11-08 DIAGNOSIS — M62.81 MUSCLE WEAKNESS OF RIGHT UPPER EXTREMITY: ICD-10-CM

## 2022-11-08 PROCEDURE — 97110 THERAPEUTIC EXERCISES: CPT | Performed by: PHYSICAL THERAPIST

## 2022-11-08 PROCEDURE — 97161 PT EVAL LOW COMPLEX 20 MIN: CPT | Performed by: PHYSICAL THERAPIST

## 2022-11-08 PROCEDURE — 97140 MANUAL THERAPY 1/> REGIONS: CPT | Performed by: PHYSICAL THERAPIST

## 2022-11-08 NOTE — PROGRESS NOTES
Physical Therapy Initial Evaluation and Plan of Care  [address], [address] [place] [address]        Patient: Dalia Whitley   : 1985  Diagnosis/ICD-10 Code:  Chronic right shoulder pain [M25.511, G89.29]  Referring practitioner: Mima Barker, *  Date of Initial Visit: 2022  Today's Date: 2022  Patient seen for 1 sessions           Subjective Questionnaire: UEFS: 34/80    Subjective Evaluation    History of Present Illness  Mechanism of injury: Pt reports that she has cervical, L sub-occipital/upper trapezius and posterior R shoulder tightness.  Pt reports that she has knife like pain, numbness and heaviness to mid-forearm in R UE.  Pt reports that she has been having this pain since November of last year with no particular LINNEA.  Pt reports that pain has gradually worsened.  Pt reports that she had an MRI of cervical spine and a NCV/EMG study of the R UE but does not have the results due to recent move.  Pt reports that she is taking gabapentin with limited relief.  Pt reports that she is going to pain management.  Pt reports that cleaning, using R UE, repeatative movements, prolonged sitting increase her pain.  Pt reports that she is an  and completes prolonged sitting/computer work with increases her pain significantly.     Pain  Current pain ratin  At best pain ratin  At worst pain rating: 10  Quality: burning, tight, knife-like, discomfort and dull ache  Relieving factors: change in position  Aggravating factors: overhead activity, lifting, prolonged positioning, repetitive movement, sleeping, movement and outstretched reach  Progression: worsening    Social Support  Lives with: spouse and young children    Hand dominance: right    Patient Goals  Patient goals for therapy: decreased pain, increased motion, increased strength, independence with ADLs/IADLs and return to sport/leisure activities             Objective          Static Posture      Head  Forward.    Shoulders  Rounded.    Scapulae  Left protracted and right protracted.    Thoracic Spine  Hyperkyphosis.    Tenderness     Right Shoulder  No tenderness in the AC joint, acromion and biceps tendon (proximal).     Active Range of Motion   Cervical/Thoracic Spine   Cervical    Flexion: WFL  Extension: WFL and with pain  Left rotation: WFL  Right rotation: WFL  Left Shoulder   Flexion: 135 degrees   Abduction: 150 degrees   External rotation 0°: 80 degrees   Internal rotation BTB: T7     Right Shoulder   Flexion: 110 degrees with pain  Abduction: 85 degrees with pain  External rotation 0°: 71 degrees with pain  Internal rotation BTB: L3 with pain    Passive Range of Motion     Right Shoulder   Flexion: 113 degrees with pain  Abduction: 98 degrees with pain  External rotation 45°: 55 degrees with pain  Internal rotation 45°: 65 degrees     Additional Passive Range of Motion Details  All PROM of R shoulder limited by pain and muscle guarding    Strength/Myotome Testing     Left Shoulder     Planes of Motion   Flexion: 4+   Extension: 4+   Abduction: 4+   External rotation at 0°: 4+   Internal rotation at 0°: 4+     Right Shoulder     Planes of Motion   Flexion: 4-   Extension: 4   Abduction: 4-   External rotation at 0°: 4   Internal rotation at 0°: 4+     Left Elbow   Flexion: 5  Extension: 5    Right Elbow   Flexion: 4  Extension: 4    Left Wrist/Hand   Wrist extension: 5  Wrist flexion: 5    Right Wrist/Hand   Wrist extension: 4  Wrist flexion: 4    Tests   Cervical     Left   Negative cervical distraction and Spurling's sign.     Right   Negative cervical distraction and Spurling's sign.     Right Shoulder   Positive Hawkin's, Neer's and painful arc.   Negative apprehension, belly press, drop arm, full can and Lan.      General Comments     Shoulder Comments   Light touch sensation normal in bilateral UEs  Increased tightness/tenderness noted in Bilateral upper trapezius, levator scapulae,  suboccipitals  Hypomobility during glides of R 1st rib noted but pt reports no pain or radicular symptoms during this    Wrist/Hand Comments  Finger abduction MMT:  Left: 5/5  Right: 4-/5         Assessment & Plan     Assessment  Impairments: abnormal or restricted ROM, activity intolerance, impaired physical strength, lacks appropriate home exercise program and pain with function  Functional Limitations: carrying objects, lifting, sleeping, pulling, pushing, uncomfortable because of pain, sitting, reaching behind back, reaching overhead and unable to perform repetitive tasks  Assessment details: Patient presents with signs/symptoms consistent with right shoulder impingement syndrome as well as potential R UE radiculopathy including decreased right shoulder ROM, right shoulder and UE weakness, poor posture and reports of pain, R UE heaviness limiting function during ADLs as shown on the UEFS.  Patient would benefit from skilled PT services in order to address deficits limiting function at this time.  HEP was given to patient this session and education on HEP/diagnosis provided to patient.       Goals  Plan Goals: 1. The patient has limited ROM of the R shoulder.    LTG 1: 12 weeks:  The patient will demonstrate 150 degrees of R shoulder flexion, 150 degrees of shoulder abduction, 80 degrees of shoulder external rotation, and  shoulder internal rotation to T8 to allow the patient to reach into upper kitchen cabinets and manipulate clothing behind the back with greater ease.    STATUS:  New   STG 1a: 6 weeks:  The patient will demonstrate 130 degrees of R shoulder flexion, 130 degrees of shoulder abduction, 75 degrees of shoulder external rotation, and shoulder internal rotation to T10.    STATUS:  New    2. The patient has limited strength of the R shoulder.   LTG 2: 12 weeks:  The patient will demonstrate 4+ /5 strength for R shoulder flexion, abduction, external rotation, and internal rotation in order to  demonstrate improved shoulder stability.    STATUS:  New   STG 2a: 6 weeks:  The patient will demonstrate 4 /5 strength for R shoulder flexion, abduction, external rotation, and internal rotation.    STATUS:  New   STG2b:  4 weeks:  The patient will be independent with home exercises.     STATUS:  New     3. The patient complains of pain/radicular symptoms in right shoulder/UE.   LTG 3: 12 weeks:  The patient will report a pain rating of 2 /10 or better in order to improve sleep quality and tolerance to performance of activities of daily living.    STATUS:  New   STG 3a: 6 weeks:  The patient will report a pain rating of 4 /10 or better.     STATUS:  New    4. Carrying, Moving, and Handling Objects Functional Limitation     LTG 4: 12 weeks:  The patient will demonstrate 1-19 % limitation by achieving a score of 65 on the UEFS.    STATUS:  New         Plan  Therapy options: will be seen for skilled therapy services  Planned modality interventions: cryotherapy, TENS, thermotherapy (hydrocollator packs) and traction  Planned therapy interventions: manual therapy, ADL retraining, neuromuscular re-education, body mechanics training, postural training, soft tissue mobilization, flexibility, spinal/joint mobilization, functional ROM exercises, strengthening, stretching, home exercise program, therapeutic activities, IADL retraining and joint mobilization  Frequency: 2x week  Duration in weeks: 12  Treatment plan discussed with: patient        Timed:         Manual Therapy:    8     mins  30596;     Therapeutic Exercise:    12     mins  73261;     Neuromuscular Mac:    0    mins  81426;    Therapeutic Activity:     0     mins  54203;     Gait Trainin     mins  88874;     Ultrasound:     0     mins  48778;    Ionto                               0    mins   97637  Self-care  __0__ mins 03720    Un-Timed:  Electrical Stimulation:    0     mins  16646 ( );  Traction     0     mins 92076  Low Eval     30      Mins  64976  Mod Eval     0     Mins  35351  High Eval                       0     Mins  44123  Hot pack     0     Mins    Cold pack                       0     Mins      Timed Treatment:   20   mins   Total Treatment:     50   mins    PT SIGNATURE: Zunilda Lawson PT      Electronically signed 11/8/2022  [place] License: [id number]    Initial Certification    Certification Period: 11/8/2022 thru 2/5/2023  NPI: [id number]  I certify that the therapy services are furnished while this patient is under my care.  The services outlined above are required by this patient, and will be reviewed every 90 days.     PHYSICIAN: Mima Barker, APRJOSE ENRIQUE      DATE:     Please sign and return via fax to [phone].. Thank you, [facility].

## 2022-11-10 ENCOUNTER — TELEPHONE (OUTPATIENT)
Dept: PHYSICAL THERAPY | Facility: CLINIC | Age: 37
End: 2022-11-10

## 2022-11-10 NOTE — TELEPHONE ENCOUNTER
SHE WAS WANTING TO TALK NOT SURE IF SHE IS OVER WORKING BUT HER WHOLE NECK IS STIFF, AND [employer] IS NOT WORKING CAN YOU PLEASE CALL , SHE SAID YOU CAN LEAVE A MESSAGE WITH ANY IDEAS

## 2022-12-02 ENCOUNTER — HOSPITAL ENCOUNTER (OUTPATIENT)
Dept: ULTRASOUND IMAGING | Facility: HOSPITAL | Age: 37
Discharge: HOME | End: 2022-12-02

## 2022-12-02 DIAGNOSIS — Z86.39 HX OF THYROID NODULE: ICD-10-CM

## 2022-12-02 PROCEDURE — 76536 US EXAM OF HEAD AND NECK: CPT

## 2022-12-05 ENCOUNTER — TELEPHONE (OUTPATIENT)
Dept: FAMILY MEDICINE CLINIC | Facility: CLINIC | Age: 37
End: 2022-12-05

## 2022-12-05 NOTE — TELEPHONE ENCOUNTER
Called and spoke to PT regarding US results, informed PT that we would call her back when the US had been resulted, PT voiced understanding

## 2022-12-05 NOTE — TELEPHONE ENCOUNTER
Caller: Dalia Whitley    Relationship: Self    Best call back number: [phone]    Caller requesting test results: SELF    What test was performed: ULTRASOUND    When was the test performed: 12/2/22    Where was the test performed: [facility]

## 2022-12-06 DIAGNOSIS — E04.1 THYROID NODULE: Primary | ICD-10-CM

## 2022-12-16 ENCOUNTER — OFFICE VISIT (OUTPATIENT)
Dept: FAMILY MEDICINE CLINIC | Facility: CLINIC | Age: 37
End: 2022-12-16

## 2022-12-16 VITALS
WEIGHT: 185.5 LBS | SYSTOLIC BLOOD PRESSURE: 162 MMHG | HEIGHT: 59 IN | TEMPERATURE: 97.7 F | DIASTOLIC BLOOD PRESSURE: 94 MMHG | OXYGEN SATURATION: 97 % | HEART RATE: 106 BPM | BODY MASS INDEX: 37.4 KG/M2

## 2022-12-16 DIAGNOSIS — E04.1 THYROID NODULE: ICD-10-CM

## 2022-12-16 DIAGNOSIS — E66.01 CLASS 2 SEVERE OBESITY DUE TO EXCESS CALORIES WITH SERIOUS COMORBIDITY AND BODY MASS INDEX (BMI) OF 36.0 TO 36.9 IN ADULT: ICD-10-CM

## 2022-12-16 DIAGNOSIS — H61.23 BILATERAL IMPACTED CERUMEN: ICD-10-CM

## 2022-12-16 DIAGNOSIS — I10 PRIMARY HYPERTENSION: Primary | ICD-10-CM

## 2022-12-16 PROCEDURE — 99214 OFFICE O/P EST MOD 30 MIN: CPT

## 2022-12-16 RX ORDER — OXYCODONE HYDROCHLORIDE AND ACETAMINOPHEN 5; 325 MG/1; MG/1
1 TABLET ORAL EVERY 6 HOURS PRN
COMMUNITY

## 2022-12-16 RX ORDER — LISINOPRIL 10 MG/1
10 TABLET ORAL DAILY
Qty: 30 TABLET | Refills: 2 | Status: SHIPPED | OUTPATIENT
Start: 2022-12-16 | End: 2023-03-13

## 2022-12-16 NOTE — PROGRESS NOTES
Chief Complaint  Chief Complaint   Patient presents with   • Follow-up     3 month follow up    • Tinnitus     X3 days        Subjective      Dalia Whitley presents to [facility]  History of Present Illness  Patient presents today to follow up on thyroid ultrasound. She has complaints of tinnitus, headaches and elevated blood pressure. BP in office today is elevated at 162/94. She has associated headache currently. She denies any chest pain or edema in her lower extremities. She did have hypertension previously and was treated with lisinopril but after her most recent pregnancy about 3 years ago her hypertension medication was stopped. She did not take medication for her blood pressure while she was pregnant.     She has a thyroid nodule that was present previously and visualized via ultrasound prior to her move to [place]. Updated thyroid ultrasound was done and confirmed 0.5 cm T RADS 4 left lower thyroid nodule.  Patient has been referred to ENT to determine if biopsy is indicated.    Patient continues to request weight loss medication.  She was advised to go to the weight loss clinic and did so but since they will not accept her insurance she is unable to pay out-of-pocket for these medications.  She is interested in taking Saxenda but must have clearance from ENT given her thyroid nodule prior to this being a safe medication for her to take.  Objective     Medical History:  Past Medical History:   Diagnosis Date   • Acid reflux    • Allergic rhinitis    • Anxiety    • Disease of thyroid gland    • Hypertension    • Insomnia    • Otalgia    • Ovarian cyst      Past Surgical History:   Procedure Laterality Date   • CARPAL TUNNEL RELEASE     •  SECTION     • ECTOPIC PREGNANCY        Social History     Tobacco Use   • Smoking status: Never   • Smokeless tobacco: Never   Vaping Use   • Vaping Use: Never used   Substance Use Topics   • Alcohol use: No   • Drug use: No  "    Family History   Problem Relation Age of Onset   • Hypertension Mother    • Diabetes Paternal Grandmother    • Heart disease Paternal Grandfather        Medications:  Prior to Admission medications    Medication Sig Start Date End Date Taking? Authorizing Provider   famotidine (PEPCID) 20 MG tablet Take 1 tablet by mouth Daily. 11/4/22  Yes Mima Barker APRN   gabapentin (NEURONTIN) 300 MG capsule Take 1 capsule by mouth 2 (Two) Times a Day.  Patient taking differently: Take 400 mg by mouth 3 (Three) Times a Day. 8/7/22  Yes Jelena Melara PA   levothyroxine (SYNTHROID, LEVOTHROID) 50 MCG tablet Take 1 tablet by mouth Daily. 11/4/22  Yes Mima Barker APRN   oxyCODONE-acetaminophen (PERCOCET) 5-325 MG per tablet Take 1 tablet by mouth Every 6 (Six) Hours As Needed for Severe Pain.   Yes Provider, MD Beto        Allergies:   Patient has no known allergies.    Health Maintenance Due   Topic Date Due   • ANNUAL PHYSICAL  Never done         Vital Signs:   /94   Pulse 106   Temp 97.7 °F (36.5 °C)   Ht 149.9 cm (59\")   Wt 84.1 kg (185 lb 8 oz)   SpO2 97%   BMI 37.47 kg/m²     Wt Readings from Last 3 Encounters:   12/16/22 84.1 kg (185 lb 8 oz)   11/04/22 81 kg (178 lb 9.6 oz)   09/23/22 81.3 kg (179 lb 3.2 oz)     BP Readings from Last 3 Encounters:   12/16/22 162/94   11/04/22 132/94   09/23/22 120/82       Class 2 Severe Obesity (BMI >=35 and <=39.9). Obesity-related health conditions include the following: hypertension. Obesity is unchanged. BMI is is above average; BMI management plan is completed. We discussed portion control and increasing exercise.       Physical Exam  Vitals reviewed.   Constitutional:       Appearance: Normal appearance. She is well-developed. She is obese.   HENT:      Head: Normocephalic and atraumatic.      Right Ear: External ear normal. There is impacted cerumen.      Left Ear: External ear normal. There is impacted cerumen.      " Mouth/Throat:      Pharynx: No oropharyngeal exudate.   Eyes:      Conjunctiva/sclera: Conjunctivae normal.      Pupils: Pupils are equal, round, and reactive to light.   Cardiovascular:      Rate and Rhythm: Normal rate and regular rhythm.      Heart sounds: No murmur heard.    No friction rub. No gallop.   Pulmonary:      Effort: Pulmonary effort is normal.      Breath sounds: Normal breath sounds. No wheezing or rhonchi.   Abdominal:      General: Bowel sounds are normal. There is no distension.      Palpations: Abdomen is soft.      Tenderness: There is no abdominal tenderness.   Skin:     General: Skin is warm and dry.   Neurological:      Mental Status: She is alert and oriented to person, place, and time.      Cranial Nerves: No cranial nerve deficit.   Psychiatric:         Mood and Affect: Mood and affect normal.         Behavior: Behavior normal.         Thought Content: Thought content normal.         Judgment: Judgment normal.          Result Review :    The following data was reviewed by SUSANNE Cline on 12/16/22 at 16:43 EST:    Common labs    Common Labs 9/23/22 9/23/22 9/23/22    1357 1357 1357   Glucose  79    BUN  14    Creatinine  0.59    Sodium  136    Potassium  3.8    Chloride  102    Calcium  9.8    Albumin  4.90    Total Bilirubin  0.4    Alkaline Phosphatase  84    AST (SGOT)  26    ALT (SGPT)  29    WBC 9.12     Hemoglobin 13.5     Hematocrit 41.4     Platelets 386     Total Cholesterol   240 (A)   Triglycerides   152 (A)   HDL Cholesterol   46   LDL Cholesterol    166 (A)   (A) Abnormal value            Lipid Panel    Lipid Panel 9/23/22   Total Cholesterol 240 (A)   Triglycerides 152 (A)   HDL Cholesterol 46   VLDL Cholesterol 28   LDL Cholesterol  166 (A)   LDL/HDL Ratio 3.56   (A) Abnormal value            TSH    TSH 9/23/22   TSH 4.490 (A)   (A) Abnormal value              US Thyroid    Result Date: 12/2/2022    1. Small 0.5 centimeter T rads 4 left lower thyroid nodule.   No other nodules are evident.  No comparison exams are available.     LAKSHMI GARDINER MD       Electronically Signed and Approved By: LAKSHMI GARDINER MD on 12/02/2022 at 14:39               Data reviewed: Radiologic studies Including ultrasound of thyroid and findings of nodule.              Assessment and Plan    Diagnoses and all orders for this visit:    1. Primary hypertension (Primary)  Assessment & Plan:  Hypertension is newly identified.  Dietary sodium restriction.  Medication changes per orders.  Ambulatory blood pressure monitoring.  Blood pressure will be reassessed at the next regular appointment.    Orders:  -     lisinopril (PRINIVIL,ZESTRIL) 10 MG tablet; Take 1 tablet by mouth Daily.  Dispense: 30 tablet; Refill: 2    2. Class 2 severe obesity due to excess calories with serious comorbidity and body mass index (BMI) of 36.0 to 36.9 in adult (HCC)  Assessment & Plan:  Patient's (Body mass index is 37.47 kg/m².) indicates that they are obese (BMI >30) with health conditions that include hypertension, dyslipidemias and GERD . Weight is unchanged. BMI is is above average; BMI management plan is completed. We discussed portion control and increasing exercise.       3. Thyroid nodule  Comments:  Follow-up with ENT for further evaluation and possible biopsy of nodule.    4. Bilateral impacted cerumen  -     carbamide peroxide (Debrox) 6.5 % otic solution; Administer 10 drops into both ears 2 (Two) Times a Day.  Dispense: 15 mL; Refill: 1     Patient will be started on lisinopril 10 mg daily for hypertension.  She will follow-up with me in 1 month to continue monitoring her blood pressure.  For thyroid nodule she has been referred to ENT for further evaluation and determination of need for biopsy or continued monitoring with imaging.  Upon clearance of thyroid nodule patient may be considered for Saxenda for weight loss.  Patient verbalized understanding and is in agreement to this treatment  plan.      Smoking Cessation:    Dalia Whitley  reports that she has never smoked. She has never used smokeless tobacco.            Follow Up   Return in about 1 month (around 1/16/2023) for Recheck.  Patient was given instructions and counseling regarding her condition or for health maintenance advice. Please see specific information pulled into the AVS if appropriate.     Please note that portions of this note were completed with a voice recognition program.

## 2022-12-16 NOTE — ASSESSMENT & PLAN NOTE
Hypertension is newly identified.  Dietary sodium restriction.  Medication changes per orders.  Ambulatory blood pressure monitoring.  Blood pressure will be reassessed at the next regular appointment.

## 2022-12-16 NOTE — ASSESSMENT & PLAN NOTE
Patient's (Body mass index is 37.47 kg/m².) indicates that they are obese (BMI >30) with health conditions that include hypertension, dyslipidemias and GERD . Weight is unchanged. BMI is is above average; BMI management plan is completed. We discussed portion control and increasing exercise.

## 2022-12-28 ENCOUNTER — OFFICE VISIT (OUTPATIENT)
Dept: FAMILY MEDICINE CLINIC | Facility: CLINIC | Age: 37
End: 2022-12-28

## 2022-12-28 VITALS
OXYGEN SATURATION: 99 % | BODY MASS INDEX: 37.29 KG/M2 | HEIGHT: 59 IN | SYSTOLIC BLOOD PRESSURE: 108 MMHG | HEART RATE: 82 BPM | TEMPERATURE: 98.5 F | DIASTOLIC BLOOD PRESSURE: 68 MMHG | WEIGHT: 185 LBS

## 2022-12-28 DIAGNOSIS — H61.23 BILATERAL IMPACTED CERUMEN: Primary | ICD-10-CM

## 2022-12-28 PROCEDURE — 99213 OFFICE O/P EST LOW 20 MIN: CPT

## 2022-12-28 PROCEDURE — 69209 REMOVE IMPACTED EAR WAX UNI: CPT

## 2022-12-28 RX ORDER — GABAPENTIN 400 MG/1
CAPSULE ORAL
COMMUNITY
Start: 2022-12-11 | End: 2022-12-28

## 2022-12-28 NOTE — PROGRESS NOTES
Chief Complaint  Chief Complaint   Patient presents with   • Ear Fullness       Subjective      Dalia Whitley presents to [facility]  History of Present Illness  Patient presents today complaining of a feeling of left ear fullness and ringing.  When she was last seen on 2022 she was found to have bilateral cerumen impaction and was prescribed Debrox solution.  She has been using that and has noticed some earwax draining from her ears.  When she used the Debrox a few days ago she noticed a popping and buzzing sound with associated fullness and pressure    Objective     Medical History:  Past Medical History:   Diagnosis Date   • Acid reflux    • Allergic rhinitis    • Anxiety    • Disease of thyroid gland    • Hypertension    • Insomnia    • Otalgia    • Ovarian cyst      Past Surgical History:   Procedure Laterality Date   • CARPAL TUNNEL RELEASE     •  SECTION     • ECTOPIC PREGNANCY        Social History     Tobacco Use   • Smoking status: Never   • Smokeless tobacco: Never   Vaping Use   • Vaping Use: Never used   Substance Use Topics   • Alcohol use: No   • Drug use: No     Family History   Problem Relation Age of Onset   • Hypertension Mother    • Diabetes Paternal Grandmother    • Heart disease Paternal Grandfather        Medications:  Prior to Admission medications    Medication Sig Start Date End Date Taking? Authorizing Provider   carbamide peroxide (Debrox) 6.5 % otic solution Administer 10 drops into both ears 2 (Two) Times a Day. 22  Yes Mima Barker APRN   famotidine (PEPCID) 20 MG tablet Take 1 tablet by mouth Daily. 22  Yes Mima Barker APRN   gabapentin (NEURONTIN) 300 MG capsule Take 1 capsule by mouth 2 (Two) Times a Day.  Patient taking differently: Take 400 mg by mouth 3 (Three) Times a Day. 22  Yes Jelena Melara PA   levothyroxine (SYNTHROID, LEVOTHROID) 50 MCG tablet Take 1 tablet by mouth Daily.  "11/4/22  Yes Mima Barker APRN   lisinopril (PRINIVIL,ZESTRIL) 10 MG tablet Take 1 tablet by mouth Daily. 12/16/22  Yes Mima Barker APRN   oxyCODONE-acetaminophen (PERCOCET) 5-325 MG per tablet Take 1 tablet by mouth Every 6 (Six) Hours As Needed for Severe Pain.   Yes Provider, MD Beto   gabapentin (NEURONTIN) 400 MG capsule TAKE 1 CAPSULE BY MOUTH THREE TIMES A DAY FOR 30 DAYS 12/11/22 12/28/22  Provider, MD Beto        Allergies:   Patient has no known allergies.    Health Maintenance Due   Topic Date Due   • ANNUAL PHYSICAL  Never done   • COVID-19 Vaccine (3 - Booster for Pfizer series) 10/11/2021         Vital Signs:   /68   Pulse 82   Temp 98.5 °F (36.9 °C)   Ht 149.9 cm (59\")   Wt 83.9 kg (185 lb)   SpO2 99%   BMI 37.37 kg/m²     Wt Readings from Last 3 Encounters:   12/28/22 83.9 kg (185 lb)   12/16/22 84.1 kg (185 lb 8 oz)   11/04/22 81 kg (178 lb 9.6 oz)     BP Readings from Last 3 Encounters:   12/28/22 108/68   12/16/22 162/94   11/04/22 132/94        Physical Exam  Vitals reviewed.   Constitutional:       Appearance: Normal appearance. She is well-developed.   HENT:      Head: Normocephalic and atraumatic.      Right Ear: External ear normal. There is impacted cerumen.      Left Ear: External ear normal. There is impacted cerumen.      Mouth/Throat:      Pharynx: No oropharyngeal exudate.   Eyes:      Conjunctiva/sclera: Conjunctivae normal.      Pupils: Pupils are equal, round, and reactive to light.   Cardiovascular:      Rate and Rhythm: Normal rate and regular rhythm.      Heart sounds: No murmur heard.    No friction rub. No gallop.   Pulmonary:      Effort: Pulmonary effort is normal.      Breath sounds: Normal breath sounds. No wheezing or rhonchi.   Abdominal:      General: Bowel sounds are normal. There is no distension.      Palpations: Abdomen is soft.      Tenderness: There is no abdominal tenderness.   Skin:     General: Skin is warm and " dry.   Neurological:      Mental Status: She is alert and oriented to person, place, and time.      Cranial Nerves: No cranial nerve deficit.   Psychiatric:         Mood and Affect: Mood and affect normal.         Behavior: Behavior normal.         Thought Content: Thought content normal.         Judgment: Judgment normal.          Result Review :    The following data was reviewed by SUSANNE Cline on 12/28/22 at 09:04 EST:    Common labs    Common Labs 9/23/22 9/23/22 9/23/22    1357 1357 1357   Glucose  79    BUN  14    Creatinine  0.59    Sodium  136    Potassium  3.8    Chloride  102    Calcium  9.8    Albumin  4.90    Total Bilirubin  0.4    Alkaline Phosphatase  84    AST (SGOT)  26    ALT (SGPT)  29    WBC 9.12     Hemoglobin 13.5     Hematocrit 41.4     Platelets 386     Total Cholesterol   240 (A)   Triglycerides   152 (A)   HDL Cholesterol   46   LDL Cholesterol    166 (A)   (A) Abnormal value                   Ear Cerumen Removal    Date/Time: 12/28/2022 8:40 AM  Performed by: Mima Barker APRN  Authorized by: Mima Barker APRN     Anesthesia:  Local Anesthetic: none  Location details: left ear  Patient tolerance: patient tolerated the procedure well with no immediate complications  Procedure type: irrigation   Sedation:  Patient sedated: no                Assessment and Plan    Diagnoses and all orders for this visit:    1. Bilateral impacted cerumen (Primary)    Other orders  -     Ear Cerumen Removal    Cerumen impaction remains on right ear with dry appearing cerumen obscuring the tympanic membrane.  Following cerumen removal via irrigation left ear is clear, tympanic membrane normal on assessment.  Patient was advised to continue using Debrox solution on right ear.  She follows up with me in approximately 3 weeks for blood pressure check and at that time we will reassess right ear and possibly perform cerumen removal via irrigation if indicated.        Smoking  Cessation:    Dalia Whitley  reports that she has never smoked. She has never used smokeless tobacco.          Follow Up   No follow-ups on file.  Patient was given instructions and counseling regarding her condition or for health maintenance advice. Please see specific information pulled into the AVS if appropriate.     Please note that portions of this note were completed with a voice recognition program.

## 2022-12-28 NOTE — PROGRESS NOTES
I IRRIGATED THE LEFT EAR WITH 10% PEROXIDE AND 90% WARM WATER. EAR WAX CAME OUT OF THE EAR WITH ONLY USING 1/4 OF THE BOTTLE. PT COULD HEAR BETTER.

## 2023-01-20 ENCOUNTER — OFFICE VISIT (OUTPATIENT)
Dept: FAMILY MEDICINE CLINIC | Facility: CLINIC | Age: 38
End: 2023-01-20
Payer: COMMERCIAL

## 2023-01-20 VITALS
WEIGHT: 192 LBS | TEMPERATURE: 97.9 F | HEIGHT: 59 IN | BODY MASS INDEX: 38.71 KG/M2 | SYSTOLIC BLOOD PRESSURE: 112 MMHG | OXYGEN SATURATION: 99 % | HEART RATE: 84 BPM | DIASTOLIC BLOOD PRESSURE: 68 MMHG

## 2023-01-20 DIAGNOSIS — E66.01 CLASS 2 SEVERE OBESITY DUE TO EXCESS CALORIES WITH SERIOUS COMORBIDITY AND BODY MASS INDEX (BMI) OF 36.0 TO 36.9 IN ADULT: ICD-10-CM

## 2023-01-20 DIAGNOSIS — I10 PRIMARY HYPERTENSION: Primary | ICD-10-CM

## 2023-01-20 DIAGNOSIS — E04.1 THYROID NODULE: ICD-10-CM

## 2023-01-20 DIAGNOSIS — J34.89 RHINORRHEA: ICD-10-CM

## 2023-01-20 PROCEDURE — 99213 OFFICE O/P EST LOW 20 MIN: CPT

## 2023-01-20 RX ORDER — FLUTICASONE PROPIONATE 50 MCG
2 SPRAY, SUSPENSION (ML) NASAL DAILY
Qty: 16 G | Refills: 2 | Status: SHIPPED | OUTPATIENT
Start: 2023-01-20

## 2023-01-20 NOTE — ASSESSMENT & PLAN NOTE
Hypertension is improving with treatment.  Medication changes per orders.  Ambulatory blood pressure monitoring.  Blood pressure will be reassessed at the next regular appointment.

## 2023-01-20 NOTE — PROGRESS NOTES
Chief Complaint  Chief Complaint   Patient presents with   • Hypertension       Subjective      Dalia Whitley presents to [facility]  History of Present Illness  Patient presents today to follow-up on hypertension.  She was started on lisinopril at her last visit.  Blood pressure is much improved.  She denies any chest pain, headaches, edema. She does report some occasional dizziness and lightheadedness upon standing. BP is significantly lower. It was as high as 162/94, then down to 108/68, 112/68.  She occasionally checks her blood pressure at work and says that the systolic number tends to be between 110 and 112.    Patient has upcoming appointment with ENT for evaluation of thyroid nodule.  That appointment is on .  Patient continues to show interest in starting a weight loss medication such as a GLP-1 if thyroid nodule is found to be benign and of no concern.    Objective     Medical History:  Past Medical History:   Diagnosis Date   • Acid reflux    • Allergic rhinitis    • Anxiety    • Disease of thyroid gland    • Hypertension    • Insomnia    • Otalgia    • Ovarian cyst      Past Surgical History:   Procedure Laterality Date   • CARPAL TUNNEL RELEASE     •  SECTION     • ECTOPIC PREGNANCY        Social History     Tobacco Use   • Smoking status: Never   • Smokeless tobacco: Never   Vaping Use   • Vaping Use: Never used   Substance Use Topics   • Alcohol use: No   • Drug use: No     Family History   Problem Relation Age of Onset   • Hypertension Mother    • Diabetes Paternal Grandmother    • Heart disease Paternal Grandfather        Medications:  Prior to Admission medications    Medication Sig Start Date End Date Taking? Authorizing Provider   carbamide peroxide (Debrox) 6.5 % otic solution Administer 10 drops into both ears 2 (Two) Times a Day. 22  Yes Mima Barker APRN   famotidine (PEPCID) 20 MG tablet Take 1 tablet by mouth Daily.  "11/4/22  Yes Mima Barker APRN   gabapentin (NEURONTIN) 300 MG capsule Take 1 capsule by mouth 2 (Two) Times a Day.  Patient taking differently: Take 400 mg by mouth 3 (Three) Times a Day. 8/7/22  Yes Jelena Melara PA   levothyroxine (SYNTHROID, LEVOTHROID) 50 MCG tablet Take 1 tablet by mouth Daily. 11/4/22  Yes Mima Barker APRN   lisinopril (PRINIVIL,ZESTRIL) 10 MG tablet Take 1 tablet by mouth Daily. 12/16/22  Yes Mima Barker APRN   oxyCODONE-acetaminophen (PERCOCET) 5-325 MG per tablet Take 1 tablet by mouth Every 6 (Six) Hours As Needed for Severe Pain.   Yes Provider, MD Beto        Allergies:   Patient has no known allergies.    Health Maintenance Due   Topic Date Due   • ANNUAL PHYSICAL  Never done         Vital Signs:   /68   Pulse 84   Temp 97.9 °F (36.6 °C)   Ht 149.9 cm (59\")   Wt 87.1 kg (192 lb)   SpO2 99%   BMI 38.78 kg/m²     Wt Readings from Last 3 Encounters:   01/20/23 87.1 kg (192 lb)   12/28/22 83.9 kg (185 lb)   12/16/22 84.1 kg (185 lb 8 oz)     BP Readings from Last 3 Encounters:   01/20/23 112/68   12/28/22 108/68   12/16/22 162/94       Class 2 Severe Obesity (BMI >=35 and <=39.9). Obesity-related health conditions include the following: hypertension. Obesity is worsening. BMI is is above average; BMI management plan is completed. We discussed portion control, increasing exercise, management of depression/anxiety/stress to control compensatory eating, pharmacologic options including Saxenda and an emily-based approach such as [employer] Pal or Lose It.       Physical Exam  Vitals reviewed.   Constitutional:       Appearance: Normal appearance. She is well-developed.   HENT:      Head: Normocephalic and atraumatic.      Nose: Rhinorrhea present.   Eyes:      Conjunctiva/sclera: Conjunctivae normal.      Pupils: Pupils are equal, round, and reactive to light.   Cardiovascular:      Rate and Rhythm: Normal rate and regular rhythm.    "   Heart sounds: No murmur heard.    No friction rub. No gallop.   Pulmonary:      Effort: Pulmonary effort is normal.      Breath sounds: Normal breath sounds. No wheezing or rhonchi.   Abdominal:      General: Bowel sounds are normal. There is no distension.      Palpations: Abdomen is soft.      Tenderness: There is no abdominal tenderness.   Skin:     General: Skin is warm and dry.   Neurological:      Mental Status: She is alert and oriented to person, place, and time.      Cranial Nerves: No cranial nerve deficit.   Psychiatric:         Mood and Affect: Mood and affect normal.         Behavior: Behavior normal.         Thought Content: Thought content normal.         Judgment: Judgment normal.          Result Review :    The following data was reviewed by SUSANNE Cline on 01/20/23 at 15:23 EST:    Common labs    Common Labs 9/23/22 9/23/22 9/23/22    1357 1357 1357   Glucose  79    BUN  14    Creatinine  0.59    Sodium  136    Potassium  3.8    Chloride  102    Calcium  9.8    Albumin  4.90    Total Bilirubin  0.4    Alkaline Phosphatase  84    AST (SGOT)  26    ALT (SGPT)  29    WBC 9.12     Hemoglobin 13.5     Hematocrit 41.4     Platelets 386     Total Cholesterol   240 (A)   Triglycerides   152 (A)   HDL Cholesterol   46   LDL Cholesterol    166 (A)   (A) Abnormal value              US Thyroid    Result Date: 12/2/2022    1. Small 0.5 centimeter T rads 4 left lower thyroid nodule.  No other nodules are evident.  No comparison exams are available.     LAKSHMI GARDINER MD       Electronically Signed and Approved By: LAKSHMI GARDINER MD on 12/02/2022 at 14:39                             Assessment and Plan    Diagnoses and all orders for this visit:    1. Primary hypertension (Primary)  Assessment & Plan:  Hypertension is improving with treatment.  Medication changes per orders.  Ambulatory blood pressure monitoring.  Blood pressure will be reassessed at the next regular appointment.      2.  Thyroid nodule  Comments:  Follow-up with ENT for further evaluation.    3. Rhinorrhea  -     fluticasone (FLONASE) 50 MCG/ACT nasal spray; 2 sprays into the nostril(s) as directed by provider Daily.  Dispense: 16 g; Refill: 2    4. Class 2 severe obesity due to excess calories with serious comorbidity and body mass index (BMI) of 36.0 to 36.9 in adult ([facility])  Assessment & Plan:  Patient's (Body mass index is 38.78 kg/m².) indicates that they are obese (BMI >30) with health conditions that include hypertension . Weight is worsening. BMI is is above average; BMI management plan is completed. We discussed portion control, increasing exercise, pharmacologic options including Saxenda and an emily-based approach such as [employer] Pal or Lose It.      Patient advised to decrease lisinopril to 5 mg. She will keep a BP log and will call with those readings or drop off the written log.  Patient will check on insurance coverage of Saxenda so that GLP-1 can be prescribed if thyroid nodule is found to be benign and of no concern after ENT consult.        Smoking Cessation:    Dalia Whitley  reports that she has never smoked. She has never used smokeless tobacco.          Follow Up   Return in about 3 months (around 4/20/2023) for Next scheduled follow up.  Patient was given instructions and counseling regarding her condition or for health maintenance advice. Please see specific information pulled into the AVS if appropriate.     Please note that portions of this note were completed with a voice recognition program.    Answers for HPI/ROS submitted by the patient on 1/13/2023  What is the primary reason for your visit?: High Blood Pressure

## 2023-01-20 NOTE — ASSESSMENT & PLAN NOTE
Patient's (Body mass index is 38.78 kg/m².) indicates that they are obese (BMI >30) with health conditions that include hypertension . Weight is worsening. BMI is is above average; BMI management plan is completed. We discussed portion control, increasing exercise, pharmacologic options including Saxenda and an emily-based approach such as [employer] Pal or Lose It.

## 2023-01-30 DIAGNOSIS — K21.9 GASTROESOPHAGEAL REFLUX DISEASE WITHOUT ESOPHAGITIS: Chronic | ICD-10-CM

## 2023-01-30 DIAGNOSIS — E03.9 HYPOTHYROIDISM, UNSPECIFIED TYPE: Chronic | ICD-10-CM

## 2023-01-30 RX ORDER — LEVOTHYROXINE SODIUM 0.05 MG/1
TABLET ORAL
Qty: 90 TABLET | Refills: 1 | Status: SHIPPED | OUTPATIENT
Start: 2023-01-30

## 2023-01-30 RX ORDER — FAMOTIDINE 20 MG/1
TABLET, FILM COATED ORAL
Qty: 90 TABLET | Refills: 1 | Status: SHIPPED | OUTPATIENT
Start: 2023-01-30

## 2023-02-02 ENCOUNTER — OFFICE VISIT (OUTPATIENT)
Dept: OTOLARYNGOLOGY | Facility: CLINIC | Age: 38
End: 2023-02-02
Payer: COMMERCIAL

## 2023-02-02 VITALS — TEMPERATURE: 97.1 F | HEIGHT: 59 IN | WEIGHT: 190 LBS | BODY MASS INDEX: 38.3 KG/M2

## 2023-02-02 DIAGNOSIS — E04.1 THYROID NODULE: Primary | ICD-10-CM

## 2023-02-02 PROCEDURE — 99213 OFFICE O/P EST LOW 20 MIN: CPT | Performed by: NURSE PRACTITIONER

## 2023-02-02 RX ORDER — GABAPENTIN 400 MG/1
CAPSULE ORAL
COMMUNITY
Start: 2023-01-22

## 2023-02-02 RX ORDER — HYDROCHLOROTHIAZIDE 25 MG/1
TABLET ORAL EVERY 24 HOURS
COMMUNITY
End: 2023-03-17

## 2023-02-02 NOTE — PROGRESS NOTES
Patient Name: Dalia Whitley   Visit Date: 02/02/2023   Patient ID: [id number]  Provider: SUSANNE Isabel    Sex: female  Location: [facility]   YOB: 1985  Location Address: [address] [place] [address], [address],?[place]?[address]    Primary Care Provider Mima Barker APRN  Location Phone: [phone]    Referring Provider: Mima Barker, *        Chief Complaint  Thyroid Nodule    Subjective   Dalia Whitley is a 37 y.o. female who presents to [facility] today as a consult from Mima Barker, * for evaluation of thyroid nodule.  Patient states that a few years ago [place] she felt like she was having a change to her voice at which time a thyroid ultrasound was performed.  She was found to have a small thyroid nodule at that time.  Patient states she has a history of hypothyroidism and has been on levothyroxine for about the past 7 years.  She states she moved to [place] and recently had a repeat thyroid ultrasound.  This was performed on 12/2/2022 and shows a small 5 mm left-sided thyroid nodule classified as a T RADS 4 nodule.  No other nodules were seen and no enlarged adenopathy.  Patient states that she does have several aunts and cousins who had a history of thyroid cancer.  She denies any personal radiation exposure.  Her last TSH level was drawn in September 2022 and TSH was slightly elevated at 4.49 with a normal T4.  She denies any dysphagia symptoms, overlying skin changes and feels like her voice is back to baseline.  She does report that she often feels like her glands are swollen in her neck.  She does have some xerostomia      Current Outpatient Medications on File Prior to Visit   Medication Sig   • famotidine (PEPCID) 20 MG tablet TAKE 1 TABLET BY MOUTH EVERY DAY   • fluticasone (FLONASE) 50 MCG/ACT nasal spray 2 sprays into the nostril(s) as directed by provider Daily.   •  "gabapentin (NEURONTIN) 400 MG capsule TAKE 1 CAPSULE BY MOUTH THREE TIMES A DAY FOR 30 DAYS   • levothyroxine (SYNTHROID, LEVOTHROID) 50 MCG tablet TAKE 1 TABLET BY MOUTH EVERY DAY   • lisinopril (PRINIVIL,ZESTRIL) 10 MG tablet Take 1 tablet by mouth Daily.   • oxyCODONE-acetaminophen (PERCOCET) 5-325 MG per tablet Take 1 tablet by mouth Every 6 (Six) Hours As Needed for Severe Pain.   • carbamide peroxide (Debrox) 6.5 % otic solution Administer 10 drops into both ears 2 (Two) Times a Day.   • gabapentin (NEURONTIN) 300 MG capsule Take 1 capsule by mouth 2 (Two) Times a Day. (Patient taking differently: Take 400 mg by mouth 3 (Three) Times a Day.)   • hydroCHLOROthiazide (HYDRODIURIL) 25 MG tablet Daily.     No current facility-administered medications on file prior to visit.        Social History     Tobacco Use   • Smoking status: Never   • Smokeless tobacco: Never   Vaping Use   • Vaping Use: Never used   Substance Use Topics   • Alcohol use: No   • Drug use: No       Objective     Vital Signs:   Temp 97.1 °F (36.2 °C) (Temporal)   Ht 149.9 cm (59\")   Wt 86.2 kg (190 lb)   BMI 38.38 kg/m²       Physical Exam  Constitutional:       General: She is not in acute distress.     Appearance: Normal appearance. She is not ill-appearing.   HENT:      Head: Normocephalic and atraumatic.      Jaw: There is normal jaw occlusion. No tenderness or pain on movement.      Salivary Glands: Right salivary gland is not diffusely enlarged or tender. Left salivary gland is not diffusely enlarged or tender.      Right Ear: Tympanic membrane, ear canal and external ear normal.      Left Ear: Tympanic membrane, ear canal and external ear normal.      Nose: Nose normal. No septal deviation.      Right Sinus: No maxillary sinus tenderness or frontal sinus tenderness.      Left Sinus: No maxillary sinus tenderness or frontal sinus tenderness.      Mouth/Throat:      Lips: Pink. No lesions.      Mouth: Mucous membranes are moist. No oral " lesions.      Dentition: Normal dentition.      Tongue: No lesions.      Palate: No mass and lesions.      Pharynx: Oropharynx is clear. Uvula midline.      Tonsils: No tonsillar exudate.   Eyes:      Extraocular Movements: Extraocular movements intact.      Conjunctiva/sclera: Conjunctivae normal.      Pupils: Pupils are equal, round, and reactive to light.   Neck:      Thyroid: No thyroid mass, thyromegaly or thyroid tenderness.      Trachea: Trachea normal.   Pulmonary:      Effort: Pulmonary effort is normal. No respiratory distress.   Musculoskeletal:         General: Normal range of motion.      Cervical back: Normal range of motion and neck supple. No tenderness.   Lymphadenopathy:      Cervical: No cervical adenopathy.   Skin:     General: Skin is warm and dry.   Neurological:      General: No focal deficit present.      Mental Status: She is alert and oriented to person, place, and time.      Cranial Nerves: No cranial nerve deficit.      Motor: No weakness.      Gait: Gait normal.   Psychiatric:         Mood and Affect: Mood normal.         Behavior: Behavior normal.         Thought Content: Thought content normal.         Judgment: Judgment normal.               Result Review :         US Thyroid (12/02/2022 14:15)  Thyroid Panel With TSH (09/23/2022 13:57)       Assessment and Plan    Diagnoses and all orders for this visit:    1. Thyroid nodule (Primary)    I was able to review her recent thyroid ultrasound.  She does have a subcentimeter left-sided lower lobe thyroid nodule classified as a TI-RADS 4 nodule.  My recommendation would be a repeat ultrasound in 1 year for follow-up.  We discussed that given the size it is not amenable to biopsy at this time.       Follow Up   No follow-ups on file.  Patient was given instructions and counseling regarding her condition or for health maintenance advice. Please see specific information pulled into the AVS if appropriate.      SUSANNE Isabel

## 2023-02-06 DIAGNOSIS — E66.01 CLASS 2 SEVERE OBESITY DUE TO EXCESS CALORIES WITH SERIOUS COMORBIDITY AND BODY MASS INDEX (BMI) OF 36.0 TO 36.9 IN ADULT: Primary | ICD-10-CM

## 2023-02-06 RX ORDER — PEN NEEDLE, DIABETIC 31 G X1/4"
1 NEEDLE, DISPOSABLE MISCELLANEOUS DAILY
Qty: 100 EACH | Refills: 0 | Status: SHIPPED | OUTPATIENT
Start: 2023-02-06 | End: 2023-03-17

## 2023-02-08 ENCOUNTER — TELEPHONE (OUTPATIENT)
Dept: FAMILY MEDICINE CLINIC | Facility: CLINIC | Age: 38
End: 2023-02-08

## 2023-02-08 NOTE — TELEPHONE ENCOUNTER
Deyanira from [facility] called to inform of a PA denial for the following medication.    Liraglutide (SAXENDA) 18 MG/3ML injection pen    Call back # [phone]

## 2023-02-17 DIAGNOSIS — Z51.81 MEDICATION MONITORING ENCOUNTER: Primary | ICD-10-CM

## 2023-02-17 DIAGNOSIS — I10 PRIMARY HYPERTENSION: ICD-10-CM

## 2023-02-27 ENCOUNTER — DOCUMENTATION (OUTPATIENT)
Dept: PHYSICAL THERAPY | Facility: CLINIC | Age: 38
End: 2023-02-27
Payer: COMMERCIAL

## 2023-02-27 NOTE — PROGRESS NOTES
Discharge Summary  Discharge Summary from Physical Therapy Report      Dates  PT visit: 11/08/22  Number of Visits: 1       Goals: Not Met      Date of Discharge 2/27/23        Zunilda Lawson, PT  Physical Therapist  [place] License: [id number]

## 2023-03-13 DIAGNOSIS — I10 PRIMARY HYPERTENSION: ICD-10-CM

## 2023-03-13 RX ORDER — LISINOPRIL 10 MG/1
TABLET ORAL
Qty: 90 TABLET | Refills: 2 | Status: SHIPPED | OUTPATIENT
Start: 2023-03-13

## 2023-03-17 ENCOUNTER — OFFICE VISIT (OUTPATIENT)
Dept: FAMILY MEDICINE CLINIC | Facility: CLINIC | Age: 38
End: 2023-03-17
Payer: COMMERCIAL

## 2023-03-17 VITALS
WEIGHT: 187.1 LBS | HEIGHT: 59 IN | BODY MASS INDEX: 37.72 KG/M2 | OXYGEN SATURATION: 100 % | SYSTOLIC BLOOD PRESSURE: 106 MMHG | HEART RATE: 74 BPM | TEMPERATURE: 97.7 F | DIASTOLIC BLOOD PRESSURE: 72 MMHG

## 2023-03-17 DIAGNOSIS — K21.9 GASTROESOPHAGEAL REFLUX DISEASE WITHOUT ESOPHAGITIS: ICD-10-CM

## 2023-03-17 DIAGNOSIS — F41.9 ANXIETY: ICD-10-CM

## 2023-03-17 DIAGNOSIS — Z51.81 MEDICATION MONITORING ENCOUNTER: Primary | ICD-10-CM

## 2023-03-17 DIAGNOSIS — I10 PRIMARY HYPERTENSION: ICD-10-CM

## 2023-03-17 DIAGNOSIS — E03.9 HYPOTHYROIDISM, UNSPECIFIED TYPE: ICD-10-CM

## 2023-03-17 DIAGNOSIS — E66.01 CLASS 2 SEVERE OBESITY DUE TO EXCESS CALORIES WITH SERIOUS COMORBIDITY AND BODY MASS INDEX (BMI) OF 37.0 TO 37.9 IN ADULT: ICD-10-CM

## 2023-03-17 LAB
AMPHET+METHAMPHET UR QL: POSITIVE
AMPHETAMINE INTERNAL CONTROL: ABNORMAL
AMPHETAMINES UR QL: NEGATIVE
BARBITURATE INTERNAL CONTROL: ABNORMAL
BARBITURATES UR QL SCN: NEGATIVE
BENZODIAZ UR QL SCN: NEGATIVE
BENZODIAZEPINE INTERNAL CONTROL: ABNORMAL
BUPRENORPHINE INTERNAL CONTROL: ABNORMAL
BUPRENORPHINE SERPL-MCNC: NEGATIVE NG/ML
CANNABINOIDS SERPL QL: NEGATIVE
COCAINE INTERNAL CONTROL: ABNORMAL
COCAINE UR QL: NEGATIVE
EXPIRATION DATE: ABNORMAL
Lab: ABNORMAL
MDMA (ECSTASY) INTERNAL CONTROL: ABNORMAL
MDMA UR QL SCN: NEGATIVE
METHADONE INTERNAL CONTROL: ABNORMAL
METHADONE UR QL SCN: NEGATIVE
METHAMPHETAMINE INTERNAL CONTROL: ABNORMAL
OPIATES INTERNAL CONTROL: ABNORMAL
OPIATES UR QL: NEGATIVE
OXYCODONE INTERNAL CONTROL: ABNORMAL
OXYCODONE UR QL SCN: NEGATIVE
PCP UR QL SCN: NEGATIVE
PHENCYCLIDINE INTERNAL CONTROL: ABNORMAL
THC INTERNAL CONTROL: ABNORMAL

## 2023-03-17 PROCEDURE — 80305 DRUG TEST PRSMV DIR OPT OBS: CPT

## 2023-03-17 PROCEDURE — 80307 DRUG TEST PRSMV CHEM ANLYZR: CPT

## 2023-03-17 PROCEDURE — 99214 OFFICE O/P EST MOD 30 MIN: CPT

## 2023-03-17 PROCEDURE — 93000 ELECTROCARDIOGRAM COMPLETE: CPT

## 2023-03-17 NOTE — ASSESSMENT & PLAN NOTE
Patient was encouraged to continue Synthroid at current dose although she would like to come off of as many medications as possible.  I discussed with her that stopping the Synthroid could result and negative outcomes, specifically cardiovascular related outcomes.  Patient will continue current dose of levothyroxine and we will continue to monitor thyroid levels at follow-up visits.

## 2023-03-17 NOTE — PROGRESS NOTES
Chief Complaint  Chief Complaint   Patient presents with   • Weight management       Subjective      Dalia Whitley presents to [facility]  History of Present Illness  Patient presents today to discuss obesity and hopes to lose weight.  She had previously been prescribed GLP-1 antagonist, Saxenda, but her insurance has denied this medication.  Patient would like to begin taking an alternative medication such as phentermine in hopes to lose weight.  On review of medical record patient has lost approximately 5 pounds in the last 2 months. She reports that she has been to Dr. Narendra Segura and received Vitachrom injection once about a week ago.  She has taken phentermine in the past, most recently in 2022.  Unsure of where she obtained this medication.    She sees pain management, [facility], where she is prescribed gabapentin and percocet. She received a nerve block for her shoulder pain on 2023. She follows up with them in 1 month.     Patient reports intermittent chest discomfort that typically occurs while she is at work.  She does report some work-related stress but denies feeling significantly overwhelmed, stressed or anxious.  She reports a history of anxiety for which she was taking antianxiety medications in the past, several years ago.  Objective     Medical History:  Past Medical History:   Diagnosis Date   • Acid reflux    • Allergic rhinitis    • Anxiety    • Disease of thyroid gland    • Hypertension    • Insomnia    • Otalgia    • Ovarian cyst      Past Surgical History:   Procedure Laterality Date   • CARPAL TUNNEL RELEASE     •  SECTION     • ECTOPIC PREGNANCY        Social History     Tobacco Use   • Smoking status: Never   • Smokeless tobacco: Never   Vaping Use   • Vaping Use: Never used   Substance Use Topics   • Alcohol use: No   • Drug use: No     Family History   Problem Relation Age of Onset   • Hypertension Mother    • Thyroid  disease Paternal Grandmother    • Diabetes Paternal Grandmother    • Breast cancer Paternal Grandmother    • Heart disease Paternal Grandfather        Medications:  Prior to Admission medications    Medication Sig Start Date End Date Taking? Authorizing Provider   famotidine (PEPCID) 20 MG tablet TAKE 1 TABLET BY MOUTH EVERY DAY 1/30/23  Yes Mima Barker APRN   fluticasone (FLONASE) 50 MCG/ACT nasal spray 2 sprays into the nostril(s) as directed by provider Daily. 1/20/23  Yes Mima Barker APRN   gabapentin (NEURONTIN) 400 MG capsule TAKE 1 CAPSULE BY MOUTH THREE TIMES A DAY FOR 30 DAYS 1/22/23  Yes ProviderBeto MD   levothyroxine (SYNTHROID, LEVOTHROID) 50 MCG tablet TAKE 1 TABLET BY MOUTH EVERY DAY 1/30/23  Yes Mima Barker APRN   lisinopril (PRINIVIL,ZESTRIL) 10 MG tablet TAKE 1 TABLET BY MOUTH EVERY DAY 3/13/23  Yes Mima Barker APRN   oxyCODONE-acetaminophen (PERCOCET) 5-325 MG per tablet Take 1 tablet by mouth Every 6 (Six) Hours As Needed for Severe Pain.   Yes ProviderBeto MD   hydroCHLOROthiazide (HYDRODIURIL) 25 MG tablet Daily.    ProviderBeto MD   carbamide peroxide (Debrox) 6.5 % otic solution Administer 10 drops into both ears 2 (Two) Times a Day. 12/16/22 3/17/23  Mima Barker APRN   gabapentin (NEURONTIN) 300 MG capsule Take 1 capsule by mouth 2 (Two) Times a Day.  Patient taking differently: Take 400 mg by mouth 3 (Three) Times a Day. 8/7/22 3/17/23  Jelena Melara PA   Insulin Pen Needle (Pen Needles) 31G X 6 MM misc 1 each Daily. 2/6/23 3/17/23  Mima Barker APRN   Liraglutide (SAXENDA) 18 MG/3ML injection pen Inject 0.6mg under skin daily for week one, THEN 1.2mg daily for week two, THEN 1.8mg daily for week three, then 2.4mg daily for week four. 2/6/23 3/17/23  Mima Barker APRN        Allergies:   Patient has no known allergies.    Health Maintenance Due   Topic Date Due   • ANNUAL  "PHYSICAL  Never done         Vital Signs:   /72   Pulse 74   Temp 97.7 °F (36.5 °C) (Temporal)   Ht 149.9 cm (59\")   Wt 84.9 kg (187 lb 1.6 oz)   SpO2 100%   BMI 37.79 kg/m²     Wt Readings from Last 3 Encounters:   03/17/23 84.9 kg (187 lb 1.6 oz)   02/02/23 86.2 kg (190 lb)   01/20/23 87.1 kg (192 lb)     BP Readings from Last 3 Encounters:   03/17/23 106/72   01/20/23 112/68   12/28/22 108/68       Class 2 Severe Obesity (BMI >=35 and <=39.9). Obesity-related health conditions include the following: hypertension. Obesity is improving with lifestyle modifications. BMI is is above average; BMI management plan is completed. We discussed portion control, increasing exercise and Weight Watchers or other Commercial based weight reduction program.       Physical Exam  Vitals reviewed.   Constitutional:       Appearance: Normal appearance. She is well-developed. She is obese.   HENT:      Head: Normocephalic and atraumatic.   Eyes:      Conjunctiva/sclera: Conjunctivae normal.      Pupils: Pupils are equal, round, and reactive to light.   Cardiovascular:      Rate and Rhythm: Normal rate and regular rhythm.      Heart sounds: No murmur heard.    No friction rub. No gallop.   Pulmonary:      Effort: Pulmonary effort is normal.      Breath sounds: Normal breath sounds. No wheezing or rhonchi.   Abdominal:      General: Bowel sounds are normal. There is no distension.      Palpations: Abdomen is soft.      Tenderness: There is no abdominal tenderness.   Skin:     General: Skin is warm and dry.   Neurological:      Mental Status: She is alert and oriented to person, place, and time.      Cranial Nerves: No cranial nerve deficit.   Psychiatric:         Mood and Affect: Mood and affect normal.         Behavior: Behavior normal.         Thought Content: Thought content normal.         Judgment: Judgment normal.          Result Review :    The following data was reviewed by SUSANNE Cline on 03/17/23 at " 17:15 EDT:    Common labs    Common Labs 9/23/22 9/23/22 9/23/22    1357 [id number] 1357   Glucose  79    BUN  14    Creatinine  0.59    Sodium  136    Potassium  3.8    Chloride  102    Calcium  9.8    Albumin  4.90    Total Bilirubin  0.4    Alkaline Phosphatase  84    AST (SGOT)  26    ALT (SGPT)  29    WBC 9.12     Hemoglobin 13.5     Hematocrit 41.4     Platelets 386     Total Cholesterol   240 (A)   Triglycerides   152 (A)   HDL Cholesterol   46   LDL Cholesterol    166 (A)   (A) Abnormal value            Pain Management Panel     Pain Management Panel Latest Ref Rng & Units 3/17/2023 8/5/2022    AMPHETAMINES SCREEN, URINE Negative Positive(A) Positive(A)    BARBITURATES SCREEN Negative Negative Negative    BENZODIAZEPINE SCREEN, URINE Negative Negative Negative    BUPRENORPHINEUR Negative Negative Negative    COCAINE SCREEN, URINE Negative Negative Negative    METHADONE SCREEN, URINE Negative Negative Negative    METHAMPHETAMINEUR Negative Negative Negative          US Thyroid    Result Date: 12/2/2022    1. Small 0.5 centimeter T rads 4 left lower thyroid nodule.  No other nodules are evident.  No comparison exams are available.     LAKSHMI GARDINER MD       Electronically Signed and Approved By: LAKSHMI GARDINER MD on 12/02/2022 at 14:39               Data reviewed: Radiologic studies Including thyroid ultrasound and Consultant notes From ENT for evaluation of thyroid nodule.       ECG 12 Lead    Date/Time: 3/17/2023 5:13 PM  Performed by: Mima Barker APRN  Authorized by: Mima Barker APRN   Comparison: not compared with previous ECG   Rhythm: sinus rhythm  Rate: normal  Conduction: conduction normal  ST Segments: ST segments normal  T flattening: V2 and V4  QRS axis: normal  Other: no other findings    Clinical impression: normal ECG                Assessment and Plan    Diagnoses and all orders for this visit:    1. Medication monitoring encounter (Primary)  -     POC Urine Drug Screen  [employer]  -     Drug Screen 16 w/Reflex Confirmation  -     ECG 12 Lead    2. Primary hypertension  Assessment & Plan:  Hypertension is improving with treatment.  Continue current treatment regimen.  Dietary sodium restriction.  Weight loss.  Continue current medications.  Ambulatory blood pressure monitoring.  Blood pressure will be reassessed in 3 months.    Patient is currently taking lisinopril 5 mg daily.  Blood pressure is adequate in office today.  She was encouraged to continue current dose and with further weight loss patient may be able to come off of medication altogether.      3. Gastroesophageal reflux disease without esophagitis  Assessment & Plan:  Continue use of Pepcid as needed for GERD related symptoms.      4. Hypothyroidism, unspecified type  Assessment & Plan:  Patient was encouraged to continue Synthroid at current dose although she would like to come off of as many medications as possible.  I discussed with her that stopping the Synthroid could result and negative outcomes, specifically cardiovascular related outcomes.  Patient will continue current dose of levothyroxine and we will continue to monitor thyroid levels at follow-up visits.      5. Class 2 severe obesity due to excess calories with serious comorbidity and body mass index (BMI) of 37.0 to 37.9 in adult (HCC)  Assessment & Plan:  Patient's (Body mass index is 37.79 kg/m².) indicates that they are obese (BMI >30) with health conditions that include hypertension and GERD . Weight is improving with lifestyle modifications. BMI  is above average; BMI management plan is completed. We discussed portion control, increasing exercise, Weight Watchers or other Commercial based weight reduction program and management of depression/anxiety/stress to control compensatory eating.       6. Anxiety  Comments:  Discussed treatment options including deep breathing, meditation, therapy.  Patient prefers to not take any additional medications  at this time.      Urine will be sent off for confirmation to determine source of positive amphetamine result.  On review of medical record patient had similar result in August 2022 when she was seeing a different PCP.  I discussed with patient the importance of being compliant with her medications, specifically thyroid medications, to avoid any negative outcomes of not taking this medication.  Her hypertension is improving and I suspect that as she continues to lose weight and continue making lifestyle changes that she may not need antihypertensives and we can stop these medications altogether.      Smoking Cessation:    Dalai Whitley  reports that she has never smoked. She has never used smokeless tobacco.          Follow Up   Return in about 3 months (around 6/17/2023) for Next scheduled follow up.  Patient was given instructions and counseling regarding her condition or for health maintenance advice. Please see specific information pulled into the AVS if appropriate.     Please note that portions of this note were completed with a voice recognition program.

## 2023-03-17 NOTE — ASSESSMENT & PLAN NOTE
Patient's (Body mass index is 37.79 kg/m².) indicates that they are obese (BMI >30) with health conditions that include hypertension and GERD . Weight is improving with lifestyle modifications. BMI  is above average; BMI management plan is completed. We discussed portion control, increasing exercise, Weight Watchers or other Commercial based weight reduction program and management of depression/anxiety/stress to control compensatory eating.

## 2023-03-17 NOTE — ASSESSMENT & PLAN NOTE
Hypertension is improving with treatment.  Continue current treatment regimen.  Dietary sodium restriction.  Weight loss.  Continue current medications.  Ambulatory blood pressure monitoring.  Blood pressure will be reassessed in 3 months.    Patient is currently taking lisinopril 5 mg daily.  Blood pressure is adequate in office today.  She was encouraged to continue current dose and with further weight loss patient may be able to come off of medication altogether.

## 2023-03-20 DIAGNOSIS — Z51.81 MEDICATION MONITORING ENCOUNTER: Primary | ICD-10-CM

## 2023-03-21 LAB
AMPHETAMINES UR QL SCN: NEGATIVE NG/ML
BARBITURATES UR QL SCN: NEGATIVE NG/ML
BENZODIAZ UR QL: NEGATIVE NG/ML
BZE UR QL: NEGATIVE NG/ML
CANNABINOIDS UR QL SCN: NEGATIVE NG/ML
METHADONE UR QL SCN: NEGATIVE NG/ML
OPIATES UR QL: NEGATIVE NG/ML
PCP UR QL: NEGATIVE NG/ML
PROPOXYPH UR QL SCN: NEGATIVE NG/ML

## 2023-06-22 ENCOUNTER — TELEPHONE (OUTPATIENT)
Dept: FAMILY MEDICINE CLINIC | Facility: CLINIC | Age: 38
End: 2023-06-22

## 2023-06-22 NOTE — TELEPHONE ENCOUNTER
Called and spoke to PT and informed her that labs had not been interpreted yet, informed PT that PCP is out of office until Monday 6/26/23, PT voiced understanding

## 2023-06-22 NOTE — TELEPHONE ENCOUNTER
Caller: Dalia Whitley    Relationship: Self    Best call back number: [phone]     What test was performed: LABS    When was the test performed: 6/20/23    Where was the test performed: IN OFFICE    Additional notes: PATIENT WOULD LIKE SOMEONE TO EXPLAIN THE LAB RESULTS. SHE SAW THAT THEY WERE READY AND ON MYCHART. PLEASE CALL PATIENT TO ADVISE.

## 2023-06-26 ENCOUNTER — TELEPHONE (OUTPATIENT)
Dept: FAMILY MEDICINE CLINIC | Facility: CLINIC | Age: 38
End: 2023-06-26

## 2023-07-26 DIAGNOSIS — E03.9 HYPOTHYROIDISM, UNSPECIFIED TYPE: Chronic | ICD-10-CM

## 2023-07-26 RX ORDER — LEVOTHYROXINE SODIUM 0.05 MG/1
50 TABLET ORAL DAILY
Qty: 90 TABLET | Refills: 1 | Status: SHIPPED | OUTPATIENT
Start: 2023-07-26

## 2023-07-29 DIAGNOSIS — K21.9 GASTROESOPHAGEAL REFLUX DISEASE WITHOUT ESOPHAGITIS: Chronic | ICD-10-CM

## 2023-07-31 RX ORDER — FAMOTIDINE 20 MG/1
TABLET, FILM COATED ORAL
Qty: 90 TABLET | Refills: 1 | Status: SHIPPED | OUTPATIENT
Start: 2023-07-31

## 2023-08-25 ENCOUNTER — HOSPITAL ENCOUNTER (OUTPATIENT)
Dept: GENERAL RADIOLOGY | Facility: HOSPITAL | Age: 38
Discharge: HOME | End: 2023-08-25
Payer: COMMERCIAL

## 2023-08-25 ENCOUNTER — OFFICE VISIT (OUTPATIENT)
Dept: FAMILY MEDICINE CLINIC | Facility: CLINIC | Age: 38
End: 2023-08-25
Payer: COMMERCIAL

## 2023-08-25 VITALS
DIASTOLIC BLOOD PRESSURE: 88 MMHG | SYSTOLIC BLOOD PRESSURE: 130 MMHG | BODY MASS INDEX: 33.87 KG/M2 | HEART RATE: 89 BPM | TEMPERATURE: 97.3 F | WEIGHT: 168 LBS | OXYGEN SATURATION: 99 % | HEIGHT: 59 IN

## 2023-08-25 DIAGNOSIS — R05.3 PERSISTENT COUGH FOR 3 WEEKS OR LONGER: ICD-10-CM

## 2023-08-25 DIAGNOSIS — R06.2 WHEEZING: ICD-10-CM

## 2023-08-25 DIAGNOSIS — J40 BRONCHITIS: Primary | ICD-10-CM

## 2023-08-25 LAB
EXPIRATION DATE: NORMAL
FLUAV AG UPPER RESP QL IA.RAPID: NOT DETECTED
FLUBV AG UPPER RESP QL IA.RAPID: NOT DETECTED
INTERNAL CONTROL: NORMAL
Lab: NORMAL
SARS-COV-2 AG UPPER RESP QL IA.RAPID: NOT DETECTED

## 2023-08-25 PROCEDURE — 71046 X-RAY EXAM CHEST 2 VIEWS: CPT

## 2023-08-25 RX ORDER — AMOXICILLIN AND CLAVULANATE POTASSIUM 875; 125 MG/1; MG/1
1 TABLET, FILM COATED ORAL 2 TIMES DAILY
Qty: 14 TABLET | Refills: 0 | Status: SHIPPED | OUTPATIENT
Start: 2023-08-25 | End: 2023-09-01

## 2023-08-25 RX ORDER — SACCHAROMYCES BOULARDII 250 MG
250 CAPSULE ORAL 2 TIMES DAILY
Qty: 28 CAPSULE | Refills: 0 | Status: SHIPPED | OUTPATIENT
Start: 2023-08-25 | End: 2023-09-08

## 2023-08-25 NOTE — PROGRESS NOTES
Chief Complaint  Chief Complaint   Patient presents with    Cough    Wheezing    Nasal Congestion       Subjective      Dalia Whitley presents to [facility]  The patient presents today with concerns of a cough and wheezing.     The patient has not been feeling well for over 2 weeks and was seen in urgent care 1 week ago for a severe cough with wheezing. Bromfed, a steroid, Omnicef, and an albuterol inhaler were given at that time. The antibiotic is complete, she was given additional Flonase, but she is still coughing and congested. She describes her inspiratory breathing sounds as squeaky. A chest x-ray was not performed because she was doing a nerve stimulator study, had wires attached to her, and she was afraid to have an x-ray done. She has a productive cough in the mornings and a dry cough throughout the day. She has rhinorrhea and headaches but denies any ear pain or pressure. She denies any fever or chills but did have nausea and vomiting yesterday and does not feel that it is related to the antibiotics because she finished them on 2023. Her daughter was just diagnosed on 2023 with COVID-19 and quarantined. She was sick before her daughter contracted COVID-19. She would like to be tested for COVID-19 because the 3 home tests she took last week were negative and before her daughter became ill. She has no known allergies.     Objective     Medical History:  Past Medical History:   Diagnosis Date    Acid reflux     Allergic rhinitis     Anxiety     Disease of thyroid gland     Headache     Hypertension     Hypothyroidism     Insomnia     Otalgia     Ovarian cyst      Past Surgical History:   Procedure Laterality Date    CARPAL TUNNEL RELEASE       SECTION      ECTOPIC PREGNANCY        Social History     Tobacco Use    Smoking status: Never    Smokeless tobacco: Never   Vaping Use    Vaping Use: Never used   Substance Use Topics    Alcohol use: No    Drug use:  No     Family History   Problem Relation Age of Onset    Hypertension Mother     Thyroid disease Paternal Grandmother     Diabetes Paternal Grandmother     Breast cancer Paternal Grandmother     Heart disease Paternal Grandfather        Medications:  Prior to Admission medications    Medication Sig Start Date End Date Taking? Authorizing Provider   albuterol sulfate  (90 Base) MCG/ACT inhaler Inhale 2 puffs Every 4 (Four) Hours As Needed for Wheezing. 8/12/23  Yes Jeromy Chowdary PA   cetirizine (zyrTEC) 10 MG tablet Take 1 tablet every day by oral route as directed.   Yes Beto Stiles MD   famotidine (PEPCID) 20 MG tablet TAKE 1 TABLET BY MOUTH EVERY DAY 7/31/23  Yes Mima Barker APRN   fluticasone (FLONASE) 50 MCG/ACT nasal spray 2 sprays into the nostril(s) as directed by provider Daily. 1/20/23  Yes Mima Barker APRN   fluticasone (FLONASE) 50 MCG/ACT nasal spray 2 sprays into the nostril(s) as directed by provider Daily. 8/12/23  Yes Jeromy Chowdary PA   gabapentin (NEURONTIN) 400 MG capsule TAKE 1 CAPSULE BY MOUTH THREE TIMES A DAY FOR 30 DAYS 1/22/23  Yes Beto Stiles MD   levothyroxine (SYNTHROID, LEVOTHROID) 50 MCG tablet Take 1 tablet by mouth Daily. 7/26/23  Yes Mima Barker APRN   lisinopril (PRINIVIL,ZESTRIL) 10 MG tablet TAKE 1 TABLET BY MOUTH EVERY DAY 3/13/23  Yes Mima Barker APRN   oxyCODONE-acetaminophen (PERCOCET) 5-325 MG per tablet Take 1 tablet by mouth Every 6 (Six) Hours As Needed for Severe Pain.   Yes Beto Stiles MD   Ozempic, 0.25 or 0.5 MG/DOSE, 2 MG/3ML solution pen-injector 0.25 MG SUBCUTANEOUSLY WEEKLY FOR 30 DAYS 7/19/23  Yes Beto Stiles MD   vitamin D (ERGOCALCIFEROL) 1.25 MG (11671 UT) capsule capsule Take 1 capsule by mouth 1 (One) Time Per Week. 5/20/23  Yes Beto Stiles MD   brompheniramine-pseudoephedrine-DM 30-2-10 MG/5ML syrup Take 10 mL by mouth 4 (Four) Times a Day As  "Needed for Congestion, Cough or Allergies.  Patient not taking: Reported on 8/25/2023 8/12/23   Jeromy Chowdary PA        Allergies:   Patient has no known allergies.    Health Maintenance Due   Topic Date Due    TDAP/TD VACCINES (1 - Tdap) Never done    ANNUAL PHYSICAL  Never done    PAP SMEAR  07/13/2023         Vital Signs:   /88   Pulse 89   Temp 97.3 øF (36.3 øC)   Ht 149.9 cm (59\")   Wt 76.2 kg (168 lb)   SpO2 99%   BMI 33.93 kg/mý     Wt Readings from Last 3 Encounters:   08/25/23 76.2 kg (168 lb)   08/12/23 78.8 kg (173 lb 12.8 oz)   06/20/23 80.1 kg (176 lb 9.6 oz)     BP Readings from Last 3 Encounters:   08/25/23 130/88   08/12/23 111/77   06/20/23 130/85       Physical Exam  Vitals reviewed.   Constitutional:       General: She is not in acute distress.     Appearance: Normal appearance. She is well-developed.   HENT:      Head: Normocephalic and atraumatic.      Right Ear: Tympanic membrane normal.      Left Ear: Tympanic membrane normal.   Eyes:      Conjunctiva/sclera: Conjunctivae normal.      Pupils: Pupils are equal, round, and reactive to light.   Cardiovascular:      Rate and Rhythm: Normal rate and regular rhythm.      Heart sounds: No murmur heard.    No friction rub. No gallop.      Comments: Oxygenation normal.   Pulmonary:      Effort: Pulmonary effort is normal. No accessory muscle usage or respiratory distress.      Breath sounds: Examination of the right-upper field reveals wheezing. Wheezing present. No rhonchi.   Abdominal:      General: Bowel sounds are normal. There is no distension.      Palpations: Abdomen is soft.      Tenderness: There is no abdominal tenderness.   Musculoskeletal:      Cervical back: No tenderness.   Skin:     General: Skin is warm and dry.   Neurological:      Mental Status: She is alert.   Psychiatric:         Mood and Affect: Affect normal.       Result Review :    The following data was reviewed by SUSANNE Cline on 08/25/23 at " 14:18 EDT:    SARS Antigen   Date Value Ref Range Status   08/25/2023 Not Detected Not Detected, Presumptive Negative Final     Influenza A Antigen CHRIS   Date Value Ref Range Status   08/25/2023 Not Detected Not Detected Final     Influenza B Antigen CHRIS   Date Value Ref Range Status   08/25/2023 Not Detected Not Detected Final     Internal Control   Date Value Ref Range Status   08/25/2023 Passed Passed Final     Lot Number   Date Value Ref Range Status   08/25/2023 [id number]  Final     Expiration Date   Date Value Ref Range Status   08/25/2023 04/10/24  Final                   Assessment and Plan    Diagnoses and all orders for this visit:    1. Bronchitis (Primary)  -     amoxicillin-clavulanate (AUGMENTIN) 875-125 MG per tablet; Take 1 tablet by mouth 2 (Two) Times a Day for 7 days.  Dispense: 14 tablet; Refill: 0  -     saccharomyces boulardii (Florastor) 250 MG capsule; Take 1 capsule by mouth 2 (Two) Times a Day for 14 days.  Dispense: 28 capsule; Refill: 0    2. Persistent cough for 3 weeks or longer  -     XR Chest PA & Lateral; Future  -     POCT SARS-CoV-2 Antigen CHRIS + Flu  -     amoxicillin-clavulanate (AUGMENTIN) 875-125 MG per tablet; Take 1 tablet by mouth 2 (Two) Times a Day for 7 days.  Dispense: 14 tablet; Refill: 0  -     saccharomyces boulardii (Florastor) 250 MG capsule; Take 1 capsule by mouth 2 (Two) Times a Day for 14 days.  Dispense: 28 capsule; Refill: 0    3. Wheezing  -     XR Chest PA & Lateral; Future  -     POCT SARS-CoV-2 Antigen CHRIS + Flu  -     amoxicillin-clavulanate (AUGMENTIN) 875-125 MG per tablet; Take 1 tablet by mouth 2 (Two) Times a Day for 7 days.  Dispense: 14 tablet; Refill: 0  -     saccharomyces boulardii (Florastor) 250 MG capsule; Take 1 capsule by mouth 2 (Two) Times a Day for 14 days.  Dispense: 28 capsule; Refill: 0       Upper respiratory infection  The influenza and COVID-19 tests were negative. Augmentin and a probiotic are ordered because she has been sick for  over 3 weeks. Continue to use the albuterol inhaler with any shortness of breath or wheezing.     Cough with wheezing  A chest x-ray will be ordered.        Follow Up   No follow-ups on file.  Patient was given instructions and counseling regarding her condition or for health maintenance advice. Please see specific information pulled into the AVS if appropriate.     Please note that portions of this note were completed with a voice recognition program.    Transcribed from ambient dictation for SUSANNE Cline by Consuelo Fonseca.  08/25/23   15:11 EDT    Patient or patient representative verbalized consent to the visit recording.  I have personally performed the services described in this document as transcribed by the above individual, and it is both accurate and complete.

## 2023-08-28 ENCOUNTER — TELEPHONE (OUTPATIENT)
Dept: FAMILY MEDICINE CLINIC | Facility: CLINIC | Age: 38
End: 2023-08-28
Payer: COMMERCIAL

## 2023-08-28 NOTE — TELEPHONE ENCOUNTER
Caller: Dalia Whitley    Relationship: Self    Best call back number: [phone]    Caller requesting test results: PATIENT     What test was performed: X-RAY    When was the test performed: 08/25/2023    Where was the test performed: [facility]    Additional notes: PATIENT HAS SEEN THE RESULTS IN MY CHART AND WOULD LIKE SOME ONE TO GO OVER THEM WITH HER. MESSAGE CAN BE LEFT

## 2023-10-26 DIAGNOSIS — I10 PRIMARY HYPERTENSION: ICD-10-CM

## 2023-10-26 DIAGNOSIS — E03.9 HYPOTHYROIDISM, UNSPECIFIED TYPE: Chronic | ICD-10-CM

## 2023-10-26 RX ORDER — LEVOTHYROXINE SODIUM 0.05 MG/1
50 TABLET ORAL DAILY
Qty: 90 TABLET | Refills: 1 | Status: SHIPPED | OUTPATIENT
Start: 2023-10-26

## 2023-10-26 RX ORDER — LISINOPRIL 10 MG/1
TABLET ORAL
Qty: 90 TABLET | Refills: 1 | Status: SHIPPED | OUTPATIENT
Start: 2023-10-26

## 2023-11-01 ENCOUNTER — HOSPITAL ENCOUNTER (OUTPATIENT)
Dept: ULTRASOUND IMAGING | Facility: HOSPITAL | Age: 38
Discharge: HOME | End: 2023-11-01
Admitting: NURSE PRACTITIONER
Payer: COMMERCIAL

## 2023-11-01 DIAGNOSIS — E04.1 THYROID NODULE: ICD-10-CM

## 2023-11-01 PROCEDURE — 76536 US EXAM OF HEAD AND NECK: CPT

## 2023-12-01 ENCOUNTER — TRANSCRIBE ORDERS (OUTPATIENT)
Dept: ADMINISTRATIVE | Facility: HOSPITAL | Age: 38
End: 2023-12-01
Payer: COMMERCIAL

## 2023-12-01 DIAGNOSIS — M50.30 DISC DISEASE, DEGENERATIVE, CERVICAL: Primary | ICD-10-CM

## 2023-12-06 ENCOUNTER — OFFICE VISIT (OUTPATIENT)
Dept: OTOLARYNGOLOGY | Facility: CLINIC | Age: 38
End: 2023-12-06
Payer: COMMERCIAL

## 2023-12-06 ENCOUNTER — HOSPITAL ENCOUNTER (OUTPATIENT)
Dept: MRI IMAGING | Facility: HOSPITAL | Age: 38
Discharge: HOME | End: 2023-12-06
Payer: COMMERCIAL

## 2023-12-06 VITALS — HEART RATE: 109 BPM | SYSTOLIC BLOOD PRESSURE: 108 MMHG | TEMPERATURE: 97.7 F | DIASTOLIC BLOOD PRESSURE: 75 MMHG

## 2023-12-06 DIAGNOSIS — M50.30 DISC DISEASE, DEGENERATIVE, CERVICAL: ICD-10-CM

## 2023-12-06 DIAGNOSIS — E04.1 THYROID NODULE: Primary | ICD-10-CM

## 2023-12-06 PROCEDURE — 72141 MRI NECK SPINE W/O DYE: CPT

## 2023-12-06 PROCEDURE — 99212 OFFICE O/P EST SF 10 MIN: CPT | Performed by: OTOLARYNGOLOGY

## 2023-12-06 RX ORDER — PHENTERMINE HYDROCHLORIDE 37.5 MG/1
1 TABLET ORAL DAILY
COMMUNITY
Start: 2023-11-28

## 2023-12-06 NOTE — PROGRESS NOTES
Patient Name: Dalia Whitley   Visit Date: 2023   Patient ID: [id number]  Provider: Rolo Lopez MD    Sex: female  Location: [facility]   YOB: 1985  Location Address: [address] [place] [address], [address],?[place]?[id number]    Primary Care Provider Mima Barker APRN  Location Phone: [phone]    Referring Provider: No ref. provider found        Chief Complaint  10 month follow up w/ US results    History of Present Illness  Dalia Whitley is a 38 y.o. female who returns today for follow-up of a thyroid nodule.  She was originally seen by SUSANNE Isabel on 2023 please see that note for further details.  At that time, she did report a family history of thyroid cancer but denied any personal history of radiation exposure.  Thyroid ultrasound on 2022 revealed a left inferior 0.5 cm hypoechoic nodule.  Thyroid ultrasound on 2023 revealed a left 0.4 cm likely cyst.  Thyroid ultrasound on 2023 again revealed a left inferior 0.4 cm hypoechoic nodule versus cyst.  Thyroid function testing on 2023 revealed a normal TSH of 2.160 normal free T4 of 1.41.  She denies any compressive symptoms.    Past Medical History:   Diagnosis Date    Acid reflux     Allergic rhinitis     Anxiety     Disease of thyroid gland     Headache     Hypertension     Hypothyroidism     Insomnia     Otalgia     Ovarian cyst        Past Surgical History:   Procedure Laterality Date    CARPAL TUNNEL RELEASE       SECTION      ECTOPIC PREGNANCY           Current Outpatient Medications:     albuterol sulfate  (90 Base) MCG/ACT inhaler, Inhale 2 puffs Every 4 (Four) Hours As Needed for Wheezing., Disp: 18 g, Rfl: 0    fluticasone (FLONASE) 50 MCG/ACT nasal spray, 2 sprays into the nostril(s) as directed by provider Daily., Disp: 18.2 mL, Rfl: 0    gabapentin (NEURONTIN) 400 MG capsule, TAKE 1 CAPSULE BY MOUTH THREE TIMES A DAY FOR 30 DAYS, Disp: , Rfl:      levothyroxine (SYNTHROID, LEVOTHROID) 50 MCG tablet, Take 1 tablet by mouth Daily., Disp: 90 tablet, Rfl: 1    lisinopril (PRINIVIL,ZESTRIL) 10 MG tablet, TAKE 1 TABLET BY MOUTH EVERY DAY, Disp: 90 tablet, Rfl: 1    oxyCODONE-acetaminophen (PERCOCET) 5-325 MG per tablet, Take 1 tablet by mouth Every 6 (Six) Hours As Needed for Severe Pain., Disp: , Rfl:     phentermine (ADIPEX-P) 37.5 MG tablet, Take 1 tablet by mouth Daily., Disp: , Rfl:     azithromycin (Zithromax Z-Andrea) 250 MG tablet, Take 2 tablets by mouth on day 1, then 1 tablet daily on days 2-5, Disp: 6 tablet, Rfl: 0    brompheniramine-pseudoephedrine-DM 30-2-10 MG/5ML syrup, Take 10 mL by mouth 3 (Three) Times a Day As Needed for Cough., Disp: 473 mL, Rfl: 0    famotidine (PEPCID) 20 MG tablet, TAKE 1 TABLET BY MOUTH EVERY DAY, Disp: 90 tablet, Rfl: 1    meloxicam (MOBIC) 15 MG tablet, Take 1 tablet by mouth Daily., Disp: , Rfl:      No Known Allergies    Social History     Tobacco Use    Smoking status: Never    Smokeless tobacco: Never   Vaping Use    Vaping status: Never Used   Substance Use Topics    Alcohol use: No    Drug use: No        Objective     Vital Signs:   /75   Pulse 109   Temp 97.7 °F (36.5 °C)       Physical Exam    General: Well developed, well nourished patient of stated age in no acute distress. Voice is strong and clear.   Head: Normocephalic and atraumatic.  Face: No lesions.  Bilateral parotid and submandibular glands are unremarkable.  Stensen's and Warthin's ducts are productive of clear saliva bilaterally.  House-Brackmann I/VI     bilaterally.   muscles and temporomandibular joint nontender to palpation.  No TMJ crepitus.  Eyes: PERRLA, sclerae anicteric, no conjunctival injection. Extraocular movements are intact and full. No nystagmus.   Ears: Auricles are normal in appearance. Bilateral external auditory canals are unremarkable. Bilateral tympanic membranes are clear and without effusion. Hearing normal to  conversational voice.   Nose: External nose is normal in appearance. Bilateral nares are patent with normal appearing mucosa. Septum midline. Turbinates are unremarkable. No lesions.   Oral Cavity: Lips are normal in appearance. Oral mucosa is unremarkable. Gingiva is unremarkable. Normal dentition for age. Tongue is unremarkable with good movement. Hard palate is unremarkable.   Oropharynx: Soft palate is unremarkable with full movement. Uvula is unremarkable. Bilateral tonsils are unremarkable. Posterior oropharynx is unremarkable.    Larynx and hypopharynx: Deferred secondary to gag reflex.  Neck: Supple.  No mass.  Nontender to palpation.  Trachea midline. Thyroid normal size and without nodules to palpation.   Lymphatic: No lymphadenopathy upon palpation.   Psychiatric: Appropriate affect, cooperative   Neurologic: Oriented x 3, strength symmetric in all extremities, Cranial Nerves II-XII are grossly intact to confrontation   Skin: Warm and dry. No rashes.    Procedures           Result Review :               Assessment and Plan    Diagnoses and all orders for this visit:    1. Thyroid nodule (Primary)  -     US Thyroid; Future    Impressions and findings were discussed at great length.  Currently, she is seen for follow-up of a left-sided thyroid cyst versus hypoechoic nodule.  We reviewed and discussed the images from her 7/7/2023 thyroid ultrasound which revealed a left 0.4 cm cyst versus hypoechoic nodule and compared this to her previous ultrasound on 12/2/2022 which reveals this to be stable in appearance.  We discussed the pathophysiology and natural history of this condition.  Options for management were discussed and we will continue with observation with a repeat ultrasound in 1 year or sooner if needed.  She was given ample time to ask questions, all of which were answered to her satisfaction.        Follow Up   Return in about 1 year (around 12/6/2024).  Patient was given instructions and counseling  regarding her condition or for health maintenance advice. Please see specific information pulled into the AVS if appropriate.

## 2024-01-05 ENCOUNTER — OFFICE VISIT (OUTPATIENT)
Dept: FAMILY MEDICINE CLINIC | Facility: CLINIC | Age: 39
End: 2024-01-05
Payer: COMMERCIAL

## 2024-01-05 VITALS
TEMPERATURE: 98.8 F | OXYGEN SATURATION: 100 % | HEIGHT: 60 IN | HEART RATE: 86 BPM | WEIGHT: 169.8 LBS | BODY MASS INDEX: 33.34 KG/M2 | DIASTOLIC BLOOD PRESSURE: 70 MMHG | SYSTOLIC BLOOD PRESSURE: 94 MMHG

## 2024-01-05 DIAGNOSIS — R05.1 ACUTE COUGH: Primary | ICD-10-CM

## 2024-01-05 DIAGNOSIS — R06.2 WHEEZING: ICD-10-CM

## 2024-01-05 DIAGNOSIS — J01.40 ACUTE NON-RECURRENT PANSINUSITIS: ICD-10-CM

## 2024-01-05 PROCEDURE — 87428 SARSCOV & INF VIR A&B AG IA: CPT

## 2024-01-05 PROCEDURE — 99213 OFFICE O/P EST LOW 20 MIN: CPT

## 2024-01-05 RX ORDER — MELOXICAM 15 MG/1
1 TABLET ORAL DAILY
COMMUNITY
Start: 2023-12-27

## 2024-01-05 RX ORDER — BROMPHENIRAMINE MALEATE, PSEUDOEPHEDRINE HYDROCHLORIDE, AND DEXTROMETHORPHAN HYDROBROMIDE 2; 30; 10 MG/5ML; MG/5ML; MG/5ML
10 SYRUP ORAL 3 TIMES DAILY PRN
Qty: 473 ML | Refills: 0 | Status: SHIPPED | OUTPATIENT
Start: 2024-01-05

## 2024-01-05 RX ORDER — AZITHROMYCIN 250 MG/1
TABLET, FILM COATED ORAL
Qty: 6 TABLET | Refills: 0 | Status: SHIPPED | OUTPATIENT
Start: 2024-01-05

## 2024-01-05 NOTE — PROGRESS NOTES
Chief Complaint  Chief Complaint   Patient presents with    Cough    Wheezing       Subjective      Dalia Whitley presents to [facility]    Dalia Whitley is a 38-year-old female who comes in today with complaints of coughing and wheezing. This began initially on , 2023 night. She did an at-home COVID-19 test on Tuesday, 2024, that was negative.    She has had chills but denies any known fevers. She has a dry nonproductive cough. She did have a lot of sinus pressure, but has noticed a slight improvement. She also had a regular headache. She denies any nausea or vomiting. The albuterol inhaler did seem to help when she had bronchitis in 2024, but she has stopped using it. She has had a sore throat from persistent coughing. She denies any known sick contacts.    Objective     Medical History:  Past Medical History:   Diagnosis Date    Acid reflux     Allergic rhinitis     Anxiety     Disease of thyroid gland     Headache     Hypertension     Hypothyroidism     Insomnia     Otalgia     Ovarian cyst      Past Surgical History:   Procedure Laterality Date    CARPAL TUNNEL RELEASE       SECTION      ECTOPIC PREGNANCY        Social History     Tobacco Use    Smoking status: Never    Smokeless tobacco: Never   Vaping Use    Vaping Use: Never used   Substance Use Topics    Alcohol use: No    Drug use: No     Family History   Problem Relation Age of Onset    Hypertension Mother     Thyroid disease Paternal Grandmother     Diabetes Paternal Grandmother     Breast cancer Paternal Grandmother     Heart disease Paternal Grandfather        Medications:  Prior to Admission medications    Medication Sig Start Date End Date Taking? Authorizing Provider   albuterol sulfate  (90 Base) MCG/ACT inhaler Inhale 2 puffs Every 4 (Four) Hours As Needed for Wheezing. 23  Yes Jeromy Chowdary PA   cetirizine (zyrTEC) 10 MG tablet Take 1 tablet every day by oral  "route as directed.   Yes Beto Stiles MD   famotidine (PEPCID) 20 MG tablet TAKE 1 TABLET BY MOUTH EVERY DAY 7/31/23  Yes Mima Barker APRN   fluticasone (FLONASE) 50 MCG/ACT nasal spray 2 sprays into the nostril(s) as directed by provider Daily. 8/12/23  Yes Jeromy Chowdary PA   gabapentin (NEURONTIN) 400 MG capsule TAKE 1 CAPSULE BY MOUTH THREE TIMES A DAY FOR 30 DAYS 1/22/23  Yes Beto Stiles MD   levothyroxine (SYNTHROID, LEVOTHROID) 50 MCG tablet Take 1 tablet by mouth Daily. 10/26/23  Yes Mima Barker APRN   lisinopril (PRINIVIL,ZESTRIL) 10 MG tablet TAKE 1 TABLET BY MOUTH EVERY DAY 10/26/23  Yes Mima Barker APRN   meloxicam (MOBIC) 15 MG tablet Take 1 tablet by mouth Daily. 12/27/23  Yes Beto Stiles MD   oxyCODONE-acetaminophen (PERCOCET) 5-325 MG per tablet Take 1 tablet by mouth Every 6 (Six) Hours As Needed for Severe Pain.   Yes Beto Stiles MD   phentermine (ADIPEX-P) 37.5 MG tablet Take 1 tablet by mouth Daily. 11/28/23  Yes Beto Stiles MD        Allergies:   Patient has no known allergies.    Health Maintenance Due   Topic Date Due    TDAP/TD VACCINES (1 - Tdap) Never done    ANNUAL PHYSICAL  Never done    PAP SMEAR  07/13/2023         Vital Signs:   BP 94/70 (BP Location: Left arm, Patient Position: Sitting, Cuff Size: Adult)   Pulse 86   Temp 98.8 °F (37.1 °C) (Oral)   Ht 152.4 cm (60\")   Wt 77 kg (169 lb 12.8 oz)   SpO2 100%   BMI 33.16 kg/m²     Wt Readings from Last 3 Encounters:   01/05/24 77 kg (169 lb 12.8 oz)   12/10/23 78.6 kg (173 lb 4.8 oz)   08/25/23 76.2 kg (168 lb)     BP Readings from Last 3 Encounters:   01/05/24 94/70   12/10/23 107/70   12/06/23 108/75       Physical Exam  Vitals reviewed.   Constitutional:       Appearance: Normal appearance. She is well-developed.   HENT:      Head: Normocephalic and atraumatic.      Right Ear: No tenderness. A middle ear effusion is present. Tympanic membrane " is not erythematous or bulging.      Left Ear: Tympanic membrane normal. No tenderness. Tympanic membrane is not erythematous or bulging.      Mouth/Throat:      Pharynx: Posterior oropharyngeal erythema present.   Eyes:      Conjunctiva/sclera: Conjunctivae normal.      Pupils: Pupils are equal, round, and reactive to light.   Cardiovascular:      Rate and Rhythm: Normal rate and regular rhythm.      Heart sounds: No murmur heard.     No friction rub. No gallop.   Pulmonary:      Effort: Pulmonary effort is normal. No respiratory distress.      Breath sounds: Normal breath sounds. Examination of the left-lower field reveals wheezing. No wheezing or rhonchi.   Abdominal:      General: Bowel sounds are normal. There is no distension.      Palpations: Abdomen is soft.      Tenderness: There is no abdominal tenderness.   Skin:     General: Skin is warm and dry.   Neurological:      Mental Status: She is alert and oriented to person, place, and time.      Cranial Nerves: No cranial nerve deficit.   Psychiatric:         Mood and Affect: Mood and affect normal.         Behavior: Behavior normal.         Thought Content: Thought content normal.         Judgment: Judgment normal.           Result Review :    The following data was reviewed by SUSANNE Cline on 01/05/24 at 15:08 EST:    SARS Antigen   Date Value Ref Range Status   08/25/2023 Not Detected Not Detected, Presumptive Negative Final     Influenza A Antigen CHRIS   Date Value Ref Range Status   08/25/2023 Not Detected Not Detected Final     Influenza B Antigen CHRIS   Date Value Ref Range Status   08/25/2023 Not Detected Not Detected Final     Internal Control   Date Value Ref Range Status   08/25/2023 Passed Passed Final     Lot Number   Date Value Ref Range Status   08/25/2023 [id number]  Final     Expiration Date   Date Value Ref Range Status   08/25/2023 04/10/24  Final            US Thyroid    Result Date: 11/2/2023    1. No change in the small 0.4  centimeter hypoechoic left thyroid nodule.  This could be a cystic nodule or T rads 4 nodule and is stable dating back to 7/7/2023 and 12/2/2022.  2. By TI-RADS criteria, follow-up is not required for T rads 4 nodules less than 1 centimeter.  If follow-up is pursued, consider follow-up in 12 months.     LAKSHMI GARDINER MD       Electronically Signed and Approved By: LAKSHMI GARDINER MD on 11/02/2023 at 7:59               Laboratory studies reviewed from 01/05/2024. Negative for COVID-19 and influenza.             Assessment and Plan    Diagnoses and all orders for this visit:    1. Acute cough (Primary)  -     brompheniramine-pseudoephedrine-DM 30-2-10 MG/5ML syrup; Take 10 mL by mouth 3 (Three) Times a Day As Needed for Cough.  Dispense: 473 mL; Refill: 0    2. Wheezing    3. Acute non-recurrent pansinusitis  -     azithromycin (Zithromax Z-Andrea) 250 MG tablet; Take 2 tablets by mouth on day 1, then 1 tablet daily on days 2-5  Dispense: 6 tablet; Refill: 0       1. Acute cough.  - She is negative for influenza, COVID-19, and strep. I will prescribe brompheniramine-pseudoephedrine-DM.    2. Acute pansinusitis  - Z-pack was prescribed to the patient.        Smoking Cessation:    Dalia Whitley  reports that she has never smoked. She has never used smokeless tobacco..            Follow Up   Return in about 3 months (around 4/5/2024) for Annual physical.  Patient was given instructions and counseling regarding her condition or for health maintenance advice. Please see specific information pulled into the AVS if appropriate.     Please note that portions of this note were completed with a voice recognition program.    SUSANNE Cline    Transcribed from ambient dictation for SUSANNE Cline by Neetu Lai.  01/05/24   15:14 EST    Patient or patient representative verbalized consent to the visit recording.  I have personally performed the services described in this document as transcribed  by the above individual, and it is both accurate and complete.

## 2024-01-10 ENCOUNTER — TELEPHONE (OUTPATIENT)
Dept: FAMILY MEDICINE CLINIC | Facility: CLINIC | Age: 39
End: 2024-01-10

## 2024-01-10 DIAGNOSIS — J01.40 ACUTE NON-RECURRENT PANSINUSITIS: ICD-10-CM

## 2024-01-10 DIAGNOSIS — R05.1 ACUTE COUGH: Primary | ICD-10-CM

## 2024-01-10 RX ORDER — AMOXICILLIN 500 MG/1
1000 CAPSULE ORAL 2 TIMES DAILY
Qty: 20 CAPSULE | Refills: 0 | Status: SHIPPED | OUTPATIENT
Start: 2024-01-10 | End: 2024-01-15

## 2024-01-10 NOTE — TELEPHONE ENCOUNTER
PATIENT STATES SHE IS TAKING THE Z GISSELLE AS PRESCRIBED SINCE 1/5/24. Please read note below and advise.

## 2024-01-10 NOTE — TELEPHONE ENCOUNTER
Caller: Dalia Whitley    Relationship: Self    Best call back number: [phone]     What medication are you requesting: SOMETHING TO TREAT SYMPTOMS    What are your current symptoms: DOUBLE EARACHE WITH RINGING    How long have you been experiencing symptoms: 1 DAY    Have you had these symptoms before:    [] Yes  [x] No    Have you been treated for these symptoms before:   [] Yes  [x] No    If a prescription is needed, what is your preferred pharmacy and phone number: [id number] - [place], [place] - [address] AT [facility] OF [address] /[facility] & [place] - [phone] [facility] [phone] FX     Additional notes:    PATIENT STATES SHE IS TAKING THE Z GISSELLE AS PRESCRIBED SINCE 1/5/24.

## 2024-01-24 DIAGNOSIS — K21.9 GASTROESOPHAGEAL REFLUX DISEASE WITHOUT ESOPHAGITIS: Chronic | ICD-10-CM

## 2024-01-24 RX ORDER — FAMOTIDINE 20 MG/1
TABLET, FILM COATED ORAL
Qty: 90 TABLET | Refills: 1 | Status: SHIPPED | OUTPATIENT
Start: 2024-01-24

## 2024-03-25 ENCOUNTER — OFFICE VISIT (OUTPATIENT)
Dept: OTOLARYNGOLOGY | Facility: CLINIC | Age: 39
End: 2024-03-25
Payer: COMMERCIAL

## 2024-03-25 VITALS
DIASTOLIC BLOOD PRESSURE: 72 MMHG | TEMPERATURE: 97.9 F | SYSTOLIC BLOOD PRESSURE: 106 MMHG | OXYGEN SATURATION: 99 % | HEART RATE: 94 BPM

## 2024-03-25 DIAGNOSIS — M54.2 NECK PAIN: Primary | ICD-10-CM

## 2024-03-25 PROCEDURE — 99213 OFFICE O/P EST LOW 20 MIN: CPT | Performed by: OTOLARYNGOLOGY

## 2024-03-25 RX ORDER — HYDROCODONE BITARTRATE AND ACETAMINOPHEN 10; 325 MG/1; MG/1
1 TABLET ORAL 3 TIMES DAILY
COMMUNITY
Start: 2024-03-06

## 2024-03-25 NOTE — PROGRESS NOTES
Patient Name: Dalia Whitley   Visit Date: 03/25/2024   Patient ID: [id number]  Provider: Rolo Lopez MD    Sex: female  Location: [facility]   YOB: 1985  Location Address: [address] [place] [address], [address],?[place]?[address]    Primary Care Provider Mima Barker APRN  Location Phone: [phone]    Referring Provider: No ref. provider found        Chief Complaint  Recurrent enlarged tender area on neck    History of Present Illness  Dalia Whitley is a 38 y.o. female who returns today for follow-up of a thyroid nodule.  She was originally seen by SUSANNE Isabel on 2/2/2023 please see that note for further details.  At that time, she did report a family history of thyroid cancer but denied any personal history of radiation exposure.  Thyroid ultrasound on 12/2/2022 revealed a left inferior 0.5 cm hypoechoic nodule.  Thyroid ultrasound on 7/7/2023 revealed a left 0.4 cm likely cyst.  Thyroid ultrasound on 11/1/2023 again revealed a left inferior 0.4 cm hypoechoic nodule versus cyst.  Thyroid function testing on 6/20/2023 revealed a normal TSH of 2.160 normal free T4 of 1.41.  She denies any compressive symptoms.  1 year follow-up ultrasound was ordered.    She returns today having last been seen on 12/6/2023.  She tells me that she has experienced approximately 2-3 episodes of bilateral neck pain and swelling since her last appointment.  She points to the area of her hyoid bone and level 2 when asked where she experiences the sensation.  They are sometimes associated with sore throat, rhinorrhea, and dysphagia.  She has not noticed any issues with fevers.  She tells me that the swelling and tenderness will often last around 10 days in duration and resolved without intervention.  She is using fluticasone for allergies and was previously on meloxicam for pain but it caused stomach irritation.  Past Medical History:   Diagnosis Date    Acid reflux      Allergic rhinitis     Anxiety     Disease of thyroid gland     Headache     Hypertension     Hypothyroidism     Insomnia     Otalgia     Ovarian cyst        Past Surgical History:   Procedure Laterality Date    CARPAL TUNNEL RELEASE       SECTION      ECTOPIC PREGNANCY           Current Outpatient Medications:     famotidine (PEPCID) 20 MG tablet, TAKE 1 TABLET BY MOUTH EVERY DAY, Disp: 90 tablet, Rfl: 1    fluticasone (FLONASE) 50 MCG/ACT nasal spray, 2 sprays into the nostril(s) as directed by provider Daily., Disp: 18.2 mL, Rfl: 0    gabapentin (NEURONTIN) 400 MG capsule, TAKE 1 CAPSULE BY MOUTH THREE TIMES A DAY FOR 30 DAYS, Disp: , Rfl:     HYDROcodone-acetaminophen (NORCO)  MG per tablet, Take 1 tablet by mouth 3 times a day., Disp: , Rfl:     levothyroxine (SYNTHROID, LEVOTHROID) 50 MCG tablet, Take 1 tablet by mouth Daily., Disp: 90 tablet, Rfl: 1    lisinopril (PRINIVIL,ZESTRIL) 10 MG tablet, TAKE 1 TABLET BY MOUTH EVERY DAY, Disp: 90 tablet, Rfl: 1    phentermine (ADIPEX-P) 37.5 MG tablet, Take 1 tablet by mouth Daily., Disp: , Rfl:     albuterol sulfate  (90 Base) MCG/ACT inhaler, Inhale 2 puffs Every 4 (Four) Hours As Needed for Wheezing. (Patient not taking: Reported on 3/25/2024), Disp: 18 g, Rfl: 0    brompheniramine-pseudoephedrine-DM 30-2-10 MG/5ML syrup, Take 10 mL by mouth 3 (Three) Times a Day As Needed for Cough. (Patient not taking: Reported on 3/25/2024), Disp: 473 mL, Rfl: 0    meloxicam (MOBIC) 15 MG tablet, Take 1 tablet by mouth Daily., Disp: , Rfl:     oxyCODONE-acetaminophen (PERCOCET) 5-325 MG per tablet, Take 1 tablet by mouth Every 6 (Six) Hours As Needed for Severe Pain., Disp: , Rfl:      No Known Allergies    Social History     Tobacco Use    Smoking status: Never    Smokeless tobacco: Never   Vaping Use    Vaping status: Never Used   Substance Use Topics    Alcohol use: No    Drug use: No        Objective     Vital Signs:   /72   Pulse 94   Temp 97.9  °F (36.6 °C)   SpO2 99%       Physical Exam    General: Well developed, well nourished patient of stated age in no acute distress. Voice is strong and clear.   Head: Normocephalic and atraumatic.  Face: No lesions.  Bilateral parotid and submandibular glands are unremarkable.  Stensen's and Warthin's ducts are productive of clear saliva bilaterally.  House-Brackmann I/VI     bilaterally.   muscles and temporomandibular joint nontender to palpation.  No TMJ crepitus.  Eyes: PERRLA, sclerae anicteric, no conjunctival injection. Extraocular movements are intact and full. No nystagmus.   Ears: Auricles are normal in appearance. Bilateral external auditory canals are unremarkable. Bilateral tympanic membranes are clear and without effusion. Hearing normal to conversational voice.   Nose: External nose is normal in appearance. Bilateral nares are patent with normal appearing mucosa. Septum midline. Turbinates are unremarkable. No lesions.   Oral Cavity: Lips are normal in appearance. Oral mucosa is unremarkable. Gingiva is unremarkable. Normal dentition for age. Tongue is unremarkable with good movement. Hard palate is unremarkable.   Oropharynx: Soft palate is unremarkable with full movement. Uvula is unremarkable. Bilateral tonsils are unremarkable. Posterior oropharynx is unremarkable.    Larynx and hypopharynx: Deferred secondary to gag reflex.  Neck: Supple.  No mass.  She is mildly tender to palpation in the area of the greater cornu of the hyoid bilaterally.  Trachea midline. Thyroid normal size and without nodules to palpation.   Lymphatic: No lymphadenopathy upon palpation.   Psychiatric: Appropriate affect, cooperative   Neurologic: Oriented x 3, strength symmetric in all extremities, Cranial Nerves II-XII are grossly intact to confrontation   Skin: Warm and dry. No rashes.    Procedures           Result Review :               Assessment and Plan    Diagnoses and all orders for this visit:    1. Neck  pain (Primary)  -     US Head Neck Soft Tissue; Future      Impressions and findings were discussed at great length.  Currently, she is seen for evaluation of intermittent bilateral neck pain and swelling sensation which lasts around 10 days in duration.  Examination today reveals mild tenderness to palpation along the greater cornu of the hyoid bilaterally.  There is no obviously palpable lymphadenopathy.  We discussed the differential including reactive lymphadenopathy versus hyoid tendinitis amongst other possibilities.  Options for further evaluation and management were discussed and we will pursue an ultrasound of the neck to rule out any lymphadenopathy.  She was given ample time to ask questions, all of which were answered to her satisfaction.        Follow Up   No follow-ups on file.  Patient was given instructions and counseling regarding her condition or for health maintenance advice. Please see specific information pulled into the AVS if appropriate.

## 2024-04-12 ENCOUNTER — OFFICE VISIT (OUTPATIENT)
Dept: FAMILY MEDICINE CLINIC | Facility: CLINIC | Age: 39
End: 2024-04-12
Payer: COMMERCIAL

## 2024-04-12 VITALS
DIASTOLIC BLOOD PRESSURE: 66 MMHG | OXYGEN SATURATION: 99 % | TEMPERATURE: 98.6 F | HEIGHT: 60 IN | SYSTOLIC BLOOD PRESSURE: 112 MMHG | HEART RATE: 87 BPM | BODY MASS INDEX: 33.81 KG/M2 | WEIGHT: 172.2 LBS

## 2024-04-12 DIAGNOSIS — Z12.31 ENCOUNTER FOR SCREENING MAMMOGRAM FOR MALIGNANT NEOPLASM OF BREAST: ICD-10-CM

## 2024-04-12 DIAGNOSIS — K21.9 GASTROESOPHAGEAL REFLUX DISEASE WITHOUT ESOPHAGITIS: ICD-10-CM

## 2024-04-12 DIAGNOSIS — E03.9 HYPOTHYROIDISM, UNSPECIFIED TYPE: ICD-10-CM

## 2024-04-12 DIAGNOSIS — I10 PRIMARY HYPERTENSION: ICD-10-CM

## 2024-04-12 DIAGNOSIS — E66.09 CLASS 1 OBESITY DUE TO EXCESS CALORIES WITH SERIOUS COMORBIDITY AND BODY MASS INDEX (BMI) OF 33.0 TO 33.9 IN ADULT: ICD-10-CM

## 2024-04-12 DIAGNOSIS — Z00.00 ANNUAL PHYSICAL EXAM: Primary | ICD-10-CM

## 2024-04-12 PROCEDURE — 80061 LIPID PANEL: CPT

## 2024-04-12 PROCEDURE — 80050 GENERAL HEALTH PANEL: CPT

## 2024-04-12 NOTE — PROGRESS NOTES
Chief Complaint  Chief Complaint   Patient presents with    Annual Exam    Follow-up       Subjective      Dalia Whitley presents to [facility]  History of Present Illness  Hypertension: Patient was previously hypertensive with blood pressures of 160s over 90s.  She was initiated on lisinopril 10 mg in 2023.  She then experienced symptomatic hypotension so lisinopril was decreased to 5 mg daily.  She continues to take lisinopril 5 mg daily and blood pressure today is 112/66, previous visit at outside clinic her blood pressure was 106/72 and 94/70.  She continues to complain of symptomatic hypotension with dizziness upon standing as well as an increase in headaches.    Hypothyroidism: Stable on current dose of levothyroxine 50 mcg daily.  History of thyroid nodules.  Thyroid ultrasound done most recently in 2023 with a stable thyroid nodule, suggested to be repeated in 2024.  She was referred over to ENT most recently seen earlier this month with concern regarding enlarged lymph nodes.  She has an upcoming ultrasound of the neck for this finding.    GERD: Improving with daily pepcid; able to identify and avoid known triggers.     20 lb weight loss over the past year; taking Adipex prescribed by other provider.    Last Pap smear was 5 years ago, denies any history of abnormal Pap smears.  Objective     Medical History:  Past Medical History:   Diagnosis Date    Acid reflux     Allergic rhinitis     Anxiety     Disease of thyroid gland     Headache     Hypertension     Hypothyroidism     Insomnia     Otalgia     Ovarian cyst      Past Surgical History:   Procedure Laterality Date    CARPAL TUNNEL RELEASE       SECTION      ECTOPIC PREGNANCY        Social History     Tobacco Use    Smoking status: Never    Smokeless tobacco: Never   Vaping Use    Vaping status: Never Used   Substance Use Topics    Alcohol use: No    Drug use: No     Family History    Problem Relation Age of Onset    Hypertension Mother     Thyroid disease Paternal Grandmother     Diabetes Paternal Grandmother     Breast cancer Paternal Grandmother     Heart disease Paternal Grandfather        Medications:  Prior to Admission medications    Medication Sig Start Date End Date Taking? Authorizing Provider   albuterol sulfate  (90 Base) MCG/ACT inhaler Inhale 2 puffs Every 4 (Four) Hours As Needed for Wheezing. 8/12/23  Yes Jeromy Chowdary PA   famotidine (PEPCID) 20 MG tablet TAKE 1 TABLET BY MOUTH EVERY DAY 1/24/24  Yes Mima Barker APRN   fluticasone (FLONASE) 50 MCG/ACT nasal spray 2 sprays into the nostril(s) as directed by provider Daily. 8/12/23  Yes Jeromy Chowdary PA   gabapentin (NEURONTIN) 400 MG capsule TAKE 1 CAPSULE BY MOUTH THREE TIMES A DAY FOR 30 DAYS 1/22/23  Yes Beto Stiles MD   HYDROcodone-acetaminophen (NORCO)  MG per tablet Take 1 tablet by mouth 3 times a day. 3/6/24  Yes Beto Stiles MD   levothyroxine (SYNTHROID, LEVOTHROID) 50 MCG tablet Take 1 tablet by mouth Daily. 10/26/23  Yes Mima Barker APRN   lisinopril (PRINIVIL,ZESTRIL) 10 MG tablet TAKE 1 TABLET BY MOUTH EVERY DAY 10/26/23  Yes Mima Barker APRN   phentermine (ADIPEX-P) 37.5 MG tablet Take 1 tablet by mouth Daily. 11/28/23  Yes Beto Stiles MD   brompheniramine-pseudoephedrine-DM 30-2-10 MG/5ML syrup Take 10 mL by mouth 3 (Three) Times a Day As Needed for Cough.  Patient not taking: Reported on 3/25/2024 1/5/24 4/12/24  Mima Barker APRN   meloxicam (MOBIC) 15 MG tablet Take 1 tablet by mouth Daily.  Patient not taking: Reported on 4/12/2024 12/27/23 4/12/24  Beto Stiles MD   oxyCODONE-acetaminophen (PERCOCET) 5-325 MG per tablet Take 1 tablet by mouth Every 6 (Six) Hours As Needed for Severe Pain.  Patient not taking: Reported on 4/12/2024 4/12/24  Provider, Historical, MD        Allergies:   Patient has no  "known allergies.    Health Maintenance Due   Topic Date Due    TDAP/TD VACCINES (1 - Tdap) Never done    ANNUAL PHYSICAL  Never done    PAP SMEAR  07/13/2023         Vital Signs:   /66   Pulse 87   Temp 98.6 °F (37 °C)   Ht 152.4 cm (60\")   Wt 78.1 kg (172 lb 3.2 oz)   SpO2 99%   BMI 33.63 kg/m²     Wt Readings from Last 3 Encounters:   04/12/24 78.1 kg (172 lb 3.2 oz)   01/05/24 77 kg (169 lb 12.8 oz)   12/10/23 78.6 kg (173 lb 4.8 oz)     BP Readings from Last 3 Encounters:   04/12/24 112/66   03/25/24 106/72   01/05/24 94/70     Physical Exam  Vitals reviewed.   Constitutional:       Appearance: Normal appearance. She is well-developed. She is obese.   HENT:      Head: Normocephalic and atraumatic.   Eyes:      Conjunctiva/sclera: Conjunctivae normal.      Pupils: Pupils are equal, round, and reactive to light.   Cardiovascular:      Rate and Rhythm: Normal rate and regular rhythm.      Heart sounds: No murmur heard.     No friction rub. No gallop.   Pulmonary:      Effort: Pulmonary effort is normal.      Breath sounds: Normal breath sounds. No wheezing or rhonchi.   Abdominal:      General: Bowel sounds are normal. There is no distension.      Palpations: Abdomen is soft.      Tenderness: There is no abdominal tenderness.   Skin:     General: Skin is warm and dry.   Neurological:      Mental Status: She is alert and oriented to person, place, and time.      Cranial Nerves: No cranial nerve deficit.   Psychiatric:         Mood and Affect: Mood and affect normal.         Behavior: Behavior normal.         Thought Content: Thought content normal.         Judgment: Judgment normal.          Result Review :    The following data was reviewed by SUSANNE Cline on 04/12/24 at 16:51 EDT:        No Images in the past 120 days found..                  Assessment and Plan    Diagnoses and all orders for this visit:    1. Annual physical exam (Primary)  -     CBC & Differential  -     Comprehensive " Metabolic Panel  -     Lipid Panel  -     TSH Rfx On Abnormal To Free T4    2. Primary hypertension  -     CBC & Differential  -     Comprehensive Metabolic Panel  -     Lipid Panel    3. Hypothyroidism, unspecified type  -     TSH Rfx On Abnormal To Free T4    4. Encounter for screening mammogram for malignant neoplasm of breast  -     Mammo Screening Digital Tomosynthesis Bilateral With CAD; Future    5. Class 1 obesity due to excess calories with serious comorbidity and body mass index (BMI) of 33.0 to 33.9 in adult    6. Gastroesophageal reflux disease without esophagitis       For hypertension patient will discontinue use of lisinopril and continue monitoring blood pressures daily.  If blood pressures elevate, patient to restart lisinopril at 5 mg daily.  Thyroid panel to be drawn today to ensure that current dose of levothyroxine is appropriate.  She is due for a well woman exam with Pap smear as well as mammogram for breast cancer screening.  She will return in approximately 3 months for Pap smear.  Continue daily use of Pepcid as needed for treatment of GERD.  She has been successful with weight loss over the last year by making changes to her diet with the use of weight loss medication prescribed to her by a different provider.         Smoking Cessation:    Dalia MCLAUGHLIN Gutierrez  reports that she has never smoked. She has never used smokeless tobacco.        Follow Up   Return in about 3 months (around 7/12/2024) for Next scheduled follow up, Well Woman with Pap.  Patient was given instructions and counseling regarding her condition or for health maintenance advice. Please see specific information pulled into the AVS if appropriate.     Please note that portions of this note were completed with a voice recognition program.

## 2024-04-13 LAB
ALBUMIN SERPL-MCNC: 4.3 G/DL (ref 3.5–5.2)
ALBUMIN/GLOB SERPL: 1.5 G/DL
ALP SERPL-CCNC: 67 U/L (ref 39–117)
ALT SERPL W P-5'-P-CCNC: 23 U/L (ref 1–33)
ANION GAP SERPL CALCULATED.3IONS-SCNC: 11.5 MMOL/L (ref 5–15)
AST SERPL-CCNC: 20 U/L (ref 1–32)
BASOPHILS # BLD AUTO: 0.03 10*3/MM3 (ref 0–0.2)
BASOPHILS NFR BLD AUTO: 0.3 % (ref 0–1.5)
BILIRUB SERPL-MCNC: 0.2 MG/DL (ref 0–1.2)
BUN SERPL-MCNC: 13 MG/DL (ref 6–20)
BUN/CREAT SERPL: 21.7 (ref 7–25)
CALCIUM SPEC-SCNC: 9.7 MG/DL (ref 8.6–10.5)
CHLORIDE SERPL-SCNC: 102 MMOL/L (ref 98–107)
CHOLEST SERPL-MCNC: 203 MG/DL (ref 0–200)
CO2 SERPL-SCNC: 24.5 MMOL/L (ref 22–29)
CREAT SERPL-MCNC: 0.6 MG/DL (ref 0.57–1)
DEPRECATED RDW RBC AUTO: 38.9 FL (ref 37–54)
EGFRCR SERPLBLD CKD-EPI 2021: 116.5 ML/MIN/1.73
EOSINOPHIL # BLD AUTO: 0.19 10*3/MM3 (ref 0–0.4)
EOSINOPHIL NFR BLD AUTO: 2.2 % (ref 0.3–6.2)
ERYTHROCYTE [DISTWIDTH] IN BLOOD BY AUTOMATED COUNT: 13.1 % (ref 12.3–15.4)
GLOBULIN UR ELPH-MCNC: 2.8 GM/DL
GLUCOSE SERPL-MCNC: 85 MG/DL (ref 65–99)
HCT VFR BLD AUTO: 35.7 % (ref 34–46.6)
HDLC SERPL-MCNC: 45 MG/DL (ref 40–60)
HGB BLD-MCNC: 11.7 G/DL (ref 12–15.9)
IMM GRANULOCYTES # BLD AUTO: 0.02 10*3/MM3 (ref 0–0.05)
IMM GRANULOCYTES NFR BLD AUTO: 0.2 % (ref 0–0.5)
LDLC SERPL CALC-MCNC: 121 MG/DL (ref 0–100)
LDLC/HDLC SERPL: 2.59 {RATIO}
LYMPHOCYTES # BLD AUTO: 2.14 10*3/MM3 (ref 0.7–3.1)
LYMPHOCYTES NFR BLD AUTO: 24.3 % (ref 19.6–45.3)
MCH RBC QN AUTO: 26.8 PG (ref 26.6–33)
MCHC RBC AUTO-ENTMCNC: 32.8 G/DL (ref 31.5–35.7)
MCV RBC AUTO: 81.7 FL (ref 79–97)
MONOCYTES # BLD AUTO: 0.66 10*3/MM3 (ref 0.1–0.9)
MONOCYTES NFR BLD AUTO: 7.5 % (ref 5–12)
NEUTROPHILS NFR BLD AUTO: 5.75 10*3/MM3 (ref 1.7–7)
NEUTROPHILS NFR BLD AUTO: 65.5 % (ref 42.7–76)
NRBC BLD AUTO-RTO: 0 /100 WBC (ref 0–0.2)
PLATELET # BLD AUTO: 418 10*3/MM3 (ref 140–450)
PMV BLD AUTO: 10.5 FL (ref 6–12)
POTASSIUM SERPL-SCNC: 3.8 MMOL/L (ref 3.5–5.2)
PROT SERPL-MCNC: 7.1 G/DL (ref 6–8.5)
RBC # BLD AUTO: 4.37 10*6/MM3 (ref 3.77–5.28)
SODIUM SERPL-SCNC: 138 MMOL/L (ref 136–145)
TRIGL SERPL-MCNC: 208 MG/DL (ref 0–150)
TSH SERPL DL<=0.05 MIU/L-ACNC: 1.62 UIU/ML (ref 0.27–4.2)
VLDLC SERPL-MCNC: 37 MG/DL (ref 5–40)
WBC NRBC COR # BLD AUTO: 8.79 10*3/MM3 (ref 3.4–10.8)

## 2024-04-15 ENCOUNTER — HOSPITAL ENCOUNTER (OUTPATIENT)
Dept: ULTRASOUND IMAGING | Facility: HOSPITAL | Age: 39
Discharge: HOME | End: 2024-04-15
Admitting: OTOLARYNGOLOGY
Payer: COMMERCIAL

## 2024-04-15 DIAGNOSIS — M54.2 NECK PAIN: ICD-10-CM

## 2024-04-15 PROCEDURE — 76536 US EXAM OF HEAD AND NECK: CPT

## 2024-05-13 ENCOUNTER — OFFICE VISIT (OUTPATIENT)
Dept: OTOLARYNGOLOGY | Facility: CLINIC | Age: 39
End: 2024-05-13
Payer: COMMERCIAL

## 2024-05-13 VITALS
SYSTOLIC BLOOD PRESSURE: 122 MMHG | TEMPERATURE: 97.8 F | OXYGEN SATURATION: 99 % | HEART RATE: 100 BPM | DIASTOLIC BLOOD PRESSURE: 85 MMHG

## 2024-05-13 DIAGNOSIS — R59.0 CERVICAL LYMPHADENOPATHY: ICD-10-CM

## 2024-05-13 DIAGNOSIS — M54.2 NECK PAIN: Primary | ICD-10-CM

## 2024-05-13 PROCEDURE — 99213 OFFICE O/P EST LOW 20 MIN: CPT | Performed by: OTOLARYNGOLOGY

## 2024-05-13 RX ORDER — AMOXICILLIN AND CLAVULANATE POTASSIUM 875; 125 MG/1; MG/1
1 TABLET, FILM COATED ORAL EVERY 12 HOURS
Qty: 28 TABLET | Refills: 0 | Status: SHIPPED | OUTPATIENT
Start: 2024-05-13 | End: 2024-05-27

## 2024-05-13 NOTE — PROGRESS NOTES
Patient Name: Dalia Whitley   Visit Date: 05/13/2024   Patient ID: [id number]  Provider: Rolo Lopez MD    Sex: female  [place] [facility]   YOB: 1985  Location Address: [address] [place] [address], [address],?[place]?[address]    Primary Care Provider Mima Barker APRN  Location Phone: [phone]    Referring Provider: No ref. provider found        Chief Complaint  US results    History of Present Illness  Dalia Whitley is a 38 y.o. female who returns today for follow-up of a thyroid nodule.  She was originally seen by SUSANNE Isabel on 2/2/2023 please see that note for further details.  At that time, she did report a family history of thyroid cancer but denied any personal history of radiation exposure.  Thyroid ultrasound on 12/2/2022 revealed a left inferior 0.5 cm hypoechoic nodule.  Thyroid ultrasound on 7/7/2023 revealed a left 0.4 cm likely cyst.  Thyroid ultrasound on 11/1/2023 again revealed a left inferior 0.4 cm hypoechoic nodule versus cyst.  Thyroid function testing on 6/20/2023 revealed a normal TSH of 2.160 normal free T4 of 1.41.  She denies any compressive symptoms.  1 year follow-up ultrasound was ordered.    She returns today having last been seen on 3/25/2024.  At which time she was experiencing intermittent bilateral neck pain and her a swelling sensation lasting 10 days in duration.  Examination that day revealed mild tenderness to palpation along the greater cornu of the hyoid bilaterally.  She was using fluticasone for allergies and was previously on meloxicam for pain but it caused stomach irritation.  Options for management were discussed and she elected to pursue an ultrasound of the neck.  Neck ultrasound on 4/15/2024 was unremarkable aside from bilateral nonenlarged lymph nodes which did demonstrate a thickened cortex.  She tells me she has experienced another episode where she feels as though she is getting a sore throat  and her neck feels swollen.  She does mention occasional tonsil stones.    Past Medical History:   Diagnosis Date    Acid reflux     Allergic rhinitis     Anxiety     Disease of thyroid gland     Headache     Hypertension     Hypothyroidism     Insomnia     Otalgia     Ovarian cyst        Past Surgical History:   Procedure Laterality Date    CARPAL TUNNEL RELEASE       SECTION      ECTOPIC PREGNANCY           Current Outpatient Medications:     albuterol sulfate  (90 Base) MCG/ACT inhaler, Inhale 2 puffs Every 4 (Four) Hours As Needed for Wheezing., Disp: 18 g, Rfl: 0    famotidine (PEPCID) 20 MG tablet, TAKE 1 TABLET BY MOUTH EVERY DAY, Disp: 90 tablet, Rfl: 1    fluticasone (FLONASE) 50 MCG/ACT nasal spray, 2 sprays into the nostril(s) as directed by provider Daily., Disp: 18.2 mL, Rfl: 0    gabapentin (NEURONTIN) 400 MG capsule, TAKE 1 CAPSULE BY MOUTH THREE TIMES A DAY FOR 30 DAYS, Disp: , Rfl:     HYDROcodone-acetaminophen (NORCO)  MG per tablet, Take 1 tablet by mouth 3 times a day., Disp: , Rfl:     levothyroxine (SYNTHROID, LEVOTHROID) 50 MCG tablet, Take 1 tablet by mouth Daily., Disp: 90 tablet, Rfl: 1    lisinopril (PRINIVIL,ZESTRIL) 10 MG tablet, TAKE 1 TABLET BY MOUTH EVERY DAY, Disp: 90 tablet, Rfl: 1    phentermine (ADIPEX-P) 37.5 MG tablet, Take 1 tablet by mouth Daily., Disp: , Rfl:     amoxicillin-clavulanate (AUGMENTIN) 875-125 MG per tablet, Take 1 tablet by mouth Every 12 (Twelve) Hours for 14 days., Disp: 28 tablet, Rfl: 0     No Known Allergies    Social History     Tobacco Use    Smoking status: Never    Smokeless tobacco: Never   Vaping Use    Vaping status: Never Used   Substance Use Topics    Alcohol use: No    Drug use: No        Objective     Vital Signs:   /85   Pulse 100   Temp 97.8 °F (36.6 °C)   SpO2 99%       Physical Exam    General: Well developed, well nourished patient of stated age in no acute distress. Voice is strong and clear.   Head:  Normocephalic and atraumatic.  Face: No lesions.  Bilateral parotid and submandibular glands are unremarkable.  Stensen's and Warthin's ducts are productive of clear saliva bilaterally.  House-Brackmann I/VI     bilaterally.   muscles and temporomandibular joint nontender to palpation.  No TMJ crepitus.  Eyes: PERRLA, sclerae anicteric, no conjunctival injection. Extraocular movements are intact and full. No nystagmus.   Ears: Auricles are normal in appearance. Bilateral external auditory canals are unremarkable. Bilateral tympanic membranes are clear and without effusion. Hearing normal to conversational voice.   Nose: External nose is normal in appearance. Bilateral nares are patent with normal appearing mucosa. Septum midline. Turbinates are unremarkable. No lesions.   Oral Cavity: Lips are normal in appearance. Oral mucosa is unremarkable. Gingiva is unremarkable. Normal dentition for age. Tongue is unremarkable with good movement. Hard palate is unremarkable.   Oropharynx: Soft palate is unremarkable with full movement. Uvula is unremarkable. Bilateral tonsils are unremarkable. Posterior oropharynx is unremarkable.    Larynx and hypopharynx: Deferred secondary to gag reflex.  Neck: Supple.  No mass.  Nontender to palpation.  Trachea midline. Thyroid normal size and without nodules to palpation.   Lymphatic: No lymphadenopathy upon palpation.   Psychiatric: Appropriate affect, cooperative   Neurologic: Oriented x 3, strength symmetric in all extremities, Cranial Nerves II-XII are grossly intact to confrontation   Skin: Warm and dry. No rashes.    Procedures           Result Review :               Assessment and Plan    Diagnoses and all orders for this visit:    1. Neck pain (Primary)  -     amoxicillin-clavulanate (AUGMENTIN) 875-125 MG per tablet; Take 1 tablet by mouth Every 12 (Twelve) Hours for 14 days.  Dispense: 28 tablet; Refill: 0  -     US Head Neck Soft Tissue; Future    2. Cervical  lymphadenopathy  -     amoxicillin-clavulanate (AUGMENTIN) 875-125 MG per tablet; Take 1 tablet by mouth Every 12 (Twelve) Hours for 14 days.  Dispense: 28 tablet; Refill: 0  -     US Head Neck Soft Tissue; Future        Impressions and findings were discussed at great length.  Currently, she continues to experience intermittent episodes of bilateral neck pain and swelling sensation which feels as though it is the beginnings of a sore throat.  Examination today is unremarkable.  Reviewed and discussed the images from her 4/15/2024 neck ultrasound which revealed bilateral nonenlarged cervical lymph nodes with thickened cortices.  We discussed that this may be consistent with more reactive lymphadenopathy which is certainly on the differential for his symptoms she is experiencing.  We discussed the pathophysiology and natural history of this condition.  Options for further evaluation and management were discussed including further workup with potential infectious etiologies versus changes versus medical management with a course of Augmentin.  We discussed options for further evaluation including repeat ultrasound and we will pursue this in 3 months.  She will be tried on a 2-week course of Augmentin.  She was given ample time to ask questions, all of which were answered to her satisfaction.        Follow Up   Return in about 10 weeks (around 7/22/2024).  Patient was given instructions and counseling regarding her condition or for health maintenance advice. Please see specific information pulled into the AVS if appropriate.

## 2024-06-03 DIAGNOSIS — K21.9 GASTROESOPHAGEAL REFLUX DISEASE WITHOUT ESOPHAGITIS: Chronic | ICD-10-CM

## 2024-06-03 RX ORDER — FAMOTIDINE 20 MG/1
20 TABLET, FILM COATED ORAL DAILY
Qty: 90 TABLET | Refills: 1 | Status: SHIPPED | OUTPATIENT
Start: 2024-06-03

## 2024-06-19 ENCOUNTER — HOSPITAL ENCOUNTER (OUTPATIENT)
Dept: MAMMOGRAPHY | Facility: HOSPITAL | Age: 39
Discharge: HOME | End: 2024-06-19
Payer: COMMERCIAL

## 2024-06-19 DIAGNOSIS — Z12.31 ENCOUNTER FOR SCREENING MAMMOGRAM FOR MALIGNANT NEOPLASM OF BREAST: ICD-10-CM

## 2024-06-19 PROCEDURE — 77063 BREAST TOMOSYNTHESIS BI: CPT

## 2024-06-19 PROCEDURE — 77067 SCR MAMMO BI INCL CAD: CPT

## 2024-06-20 DIAGNOSIS — R92.8 ABNORMALITY OF LEFT BREAST ON SCREENING MAMMOGRAM: Primary | ICD-10-CM

## 2024-07-03 ENCOUNTER — HOSPITAL ENCOUNTER (OUTPATIENT)
Dept: MAMMOGRAPHY | Facility: HOSPITAL | Age: 39
Discharge: HOME | End: 2024-07-03
Payer: COMMERCIAL

## 2024-07-03 ENCOUNTER — HOSPITAL ENCOUNTER (OUTPATIENT)
Dept: ULTRASOUND IMAGING | Facility: HOSPITAL | Age: 39
Discharge: HOME | End: 2024-07-03
Payer: COMMERCIAL

## 2024-07-03 DIAGNOSIS — R92.8 ABNORMALITY OF LEFT BREAST ON SCREENING MAMMOGRAM: ICD-10-CM

## 2024-07-03 PROCEDURE — 76642 ULTRASOUND BREAST LIMITED: CPT

## 2024-07-03 PROCEDURE — G0279 TOMOSYNTHESIS, MAMMO: HCPCS

## 2024-07-03 PROCEDURE — 77065 DX MAMMO INCL CAD UNI: CPT

## 2024-07-05 DIAGNOSIS — R92.8 ABNORMAL MAMMOGRAM OF LEFT BREAST: ICD-10-CM

## 2024-07-05 DIAGNOSIS — R92.322 SCATTERED FIBROGLANDULAR TISSUE DENSITY OF LEFT BREAST ON MAMMOGRAPHY: Primary | ICD-10-CM

## 2024-07-15 ENCOUNTER — HOSPITAL ENCOUNTER (OUTPATIENT)
Dept: ULTRASOUND IMAGING | Facility: HOSPITAL | Age: 39
Discharge: HOME | End: 2024-07-15
Admitting: OTOLARYNGOLOGY
Payer: COMMERCIAL

## 2024-07-15 DIAGNOSIS — M54.2 NECK PAIN: ICD-10-CM

## 2024-07-15 DIAGNOSIS — R59.0 CERVICAL LYMPHADENOPATHY: ICD-10-CM

## 2024-07-15 PROCEDURE — 76536 US EXAM OF HEAD AND NECK: CPT

## 2024-07-18 DIAGNOSIS — E03.9 HYPOTHYROIDISM, UNSPECIFIED TYPE: Chronic | ICD-10-CM

## 2024-07-18 RX ORDER — LEVOTHYROXINE SODIUM 0.05 MG/1
50 TABLET ORAL DAILY
Qty: 90 TABLET | Refills: 1 | Status: SHIPPED | OUTPATIENT
Start: 2024-07-18

## 2024-07-22 ENCOUNTER — OFFICE VISIT (OUTPATIENT)
Dept: OTOLARYNGOLOGY | Facility: CLINIC | Age: 39
End: 2024-07-22
Payer: COMMERCIAL

## 2024-07-22 VITALS
OXYGEN SATURATION: 99 % | DIASTOLIC BLOOD PRESSURE: 72 MMHG | SYSTOLIC BLOOD PRESSURE: 105 MMHG | HEART RATE: 75 BPM | WEIGHT: 178 LBS | HEIGHT: 60 IN | BODY MASS INDEX: 34.95 KG/M2 | TEMPERATURE: 97.8 F

## 2024-07-22 DIAGNOSIS — M54.2 NECK PAIN: Primary | ICD-10-CM

## 2024-07-22 DIAGNOSIS — R59.0 CERVICAL LYMPHADENOPATHY: ICD-10-CM

## 2024-07-22 DIAGNOSIS — E04.1 THYROID NODULE: ICD-10-CM

## 2024-07-22 PROCEDURE — 99213 OFFICE O/P EST LOW 20 MIN: CPT | Performed by: OTOLARYNGOLOGY

## 2024-07-22 NOTE — PROGRESS NOTES
Patient Name: Dalia Whitley   Visit Date: 07/22/2024   Patient ID: [id number]  Provider: Rolo Lopez MD    Sex: female  Location: [facility]   YOB: 1985  Location Address: [address] [place] [address], [address],?[place]?[address]    Primary Care Provider Mima Barker APRN  Location Phone: [phone]    Referring Provider: No ref. provider found        Chief Complaint  10 week follow up w/ US results    History of Present Illness  Dalia Whitley is a 38 y.o. female who returns today for follow-up of a thyroid nodule.  She was originally seen by SUSANNE Isabel on 2/2/2023 please see that note for further details.  At that time, she did report a family history of thyroid cancer but denied any personal history of radiation exposure.  Thyroid ultrasound on 12/2/2022 revealed a left inferior 0.5 cm hypoechoic nodule.  Thyroid ultrasound on 7/7/2023 revealed a left 0.4 cm likely cyst.  Thyroid ultrasound on 11/1/2023 again revealed a left inferior 0.4 cm hypoechoic nodule versus cyst.  Thyroid function testing on 6/20/2023 revealed a normal TSH of 2.160 normal free T4 of 1.41.  She denies any compressive symptoms.  1 year follow-up ultrasound was ordered.  Neck ultrasound on 4/15/2024 was unremarkable aside from bilateral nonenlarged lymph nodes which did demonstrate a thickened cortex.      She returns today for follow-up having last been seen on 5/13/2024.  At that time, she continued to experience intermittent episodes of bilateral neck pain and a swelling sensation.  She was placed on a 14-day course of Augmentin for her reactive lymphadenopathy.  Neck ultrasound on 7/15/2024 demonstrated a left 1 x 0.7 cm lymph node with prominent cortex and a right 1.2 x 0.7 cm lymph node with thickened cortex.  These were stable.  Thyroid function testing on 4/12/2024 revealed a normal TSH of 1.620.  She tells me that she has done fairly well since her last appointment.   "She continues to experience an intermittent sensation of neck pain and swelling which is often triggered by drinking cold water and can last the remainder of the day at times.  She has noticed that when she is drinking room temperature water that things feel better.  Past Medical History:   Diagnosis Date    Acid reflux     Allergic rhinitis     Anxiety     Disease of thyroid gland     Headache     Hypertension     Hypothyroidism     Insomnia     Otalgia     Ovarian cyst        Past Surgical History:   Procedure Laterality Date    CARPAL TUNNEL RELEASE       SECTION      ECTOPIC PREGNANCY           Current Outpatient Medications:     albuterol sulfate  (90 Base) MCG/ACT inhaler, Inhale 2 puffs Every 4 (Four) Hours As Needed for Wheezing., Disp: 18 g, Rfl: 0    famotidine (PEPCID) 20 MG tablet, Take 1 tablet by mouth Daily., Disp: 90 tablet, Rfl: 1    fluticasone (FLONASE) 50 MCG/ACT nasal spray, 2 sprays into the nostril(s) as directed by provider Daily., Disp: 18.2 mL, Rfl: 0    gabapentin (NEURONTIN) 400 MG capsule, TAKE 1 CAPSULE BY MOUTH THREE TIMES A DAY FOR 30 DAYS, Disp: , Rfl:     HYDROcodone-acetaminophen (NORCO)  MG per tablet, Take 1 tablet by mouth 3 times a day., Disp: , Rfl:     levothyroxine (SYNTHROID, LEVOTHROID) 50 MCG tablet, TAKE 1 TABLET BY MOUTH EVERY DAY, Disp: 90 tablet, Rfl: 1    lisinopril (PRINIVIL,ZESTRIL) 10 MG tablet, TAKE 1 TABLET BY MOUTH EVERY DAY, Disp: 90 tablet, Rfl: 1    phentermine (ADIPEX-P) 37.5 MG tablet, Take 1 tablet by mouth Daily., Disp: , Rfl:      No Known Allergies    Social History     Tobacco Use    Smoking status: Never    Smokeless tobacco: Never   Vaping Use    Vaping status: Never Used   Substance Use Topics    Alcohol use: No    Drug use: No        Objective     Vital Signs:   /72   Pulse 75   Temp 97.8 °F (36.6 °C)   Ht 152.4 cm (60\")   Wt 80.7 kg (178 lb)   SpO2 99%   BMI 34.76 kg/m²       Physical Exam    General: Well " developed, well nourished patient of stated age in no acute distress. Voice is strong and clear.   Head: Normocephalic and atraumatic.  Face: No lesions.  Bilateral parotid and submandibular glands are unremarkable.  Stensen's and Warthin's ducts are productive of clear saliva bilaterally.  House-Brackmann I/VI     bilaterally.   muscles and temporomandibular joint nontender to palpation.  No TMJ crepitus.  Eyes: PERRLA, sclerae anicteric, no conjunctival injection. Extraocular movements are intact and full. No nystagmus.   Ears: Auricles are normal in appearance. Bilateral external auditory canals are unremarkable. Bilateral tympanic membranes are clear and without effusion. Hearing normal to conversational voice.   Nose: External nose is normal in appearance. Bilateral nares are patent with normal appearing mucosa. Septum midline. Turbinates are unremarkable. No lesions.   Oral Cavity: Lips are normal in appearance. Oral mucosa is unremarkable. Gingiva is unremarkable. Normal dentition for age. Tongue is unremarkable with good movement. Hard palate is unremarkable.   Oropharynx: Soft palate is unremarkable with full movement. Uvula is unremarkable. Bilateral tonsils are unremarkable. Posterior oropharynx is unremarkable.    Larynx and hypopharynx: Deferred secondary to gag reflex.  Neck: Supple.  No mass.  Nontender to palpation.  Trachea midline. Thyroid normal size and without nodules to palpation.   Lymphatic: No lymphadenopathy upon palpation.   Psychiatric: Appropriate affect, cooperative   Neurologic: Oriented x 3, strength symmetric in all extremities, Cranial Nerves II-XII are grossly intact to confrontation   Skin: Warm and dry. No rashes.    Procedures           Result Review :               Assessment and Plan    Diagnoses and all orders for this visit:    1. Neck pain (Primary)    2. Thyroid nodule    3. Cervical lymphadenopathy          Impressions and findings were discussed at [facility]  length.  Currently, she continues to experience intermittent neck/throat pain and swelling which is often triggered by drinking cold liquids.  Examination today is unremarkable.  We reviewed and discussed the images from her 7/15/2024 neck ultrasound which demonstrated stable nonenlarged appearing lymph nodes with mildly thickened cortices.  We discussed the differential as well as options for further evaluation.  After thorough discussion we will continue with observation and await the images from her December thyroid ultrasound.  She was given ample time to ask questions, all of which were answered to her satisfaction.        Follow Up   Return in about 5 months (around 12/22/2024).  Patient was given instructions and counseling regarding her condition or for health maintenance advice. Please see specific information pulled into the AVS if appropriate.

## 2024-07-31 DIAGNOSIS — E03.9 HYPOTHYROIDISM, UNSPECIFIED TYPE: Chronic | ICD-10-CM

## 2024-07-31 RX ORDER — LEVOTHYROXINE SODIUM 0.05 MG/1
50 TABLET ORAL DAILY
Qty: 90 TABLET | Refills: 1 | Status: SHIPPED | OUTPATIENT
Start: 2024-07-31

## 2024-07-31 NOTE — TELEPHONE ENCOUNTER
Caller: Dalia Whitley    Relationship: Self    Best call back number:     [phone]        Requested Prescriptions:   Requested Prescriptions     Pending Prescriptions Disp Refills    levothyroxine (SYNTHROID, LEVOTHROID) 50 MCG tablet 90 tablet 1     Sig: Take 1 tablet by mouth Daily.        Pharmacy where request should be sent: [id number] - [place], [place] - [address] [facility] AT [address]/[facility] & [place] - [phone] [facility] [phone] FX     Last office visit with prescribing clinician: 4/12/2024   Last telemedicine visit with prescribing clinician: Visit date not found   Next office visit with prescribing clinician: 8/1/2024     Additional details provided by patient: PATIENT IS OUT OF MEDICATION     Does the patient have less than a 3 day supply:  [x] Yes  [] No        Milli Morales Rep   07/31/24 09:48 EDT

## 2024-08-01 ENCOUNTER — OFFICE VISIT (OUTPATIENT)
Dept: FAMILY MEDICINE CLINIC | Facility: CLINIC | Age: 39
End: 2024-08-01
Payer: COMMERCIAL

## 2024-08-01 DIAGNOSIS — Z53.21 PATIENT LEFT WITHOUT BEING SEEN: Primary | ICD-10-CM

## 2024-08-02 NOTE — PROGRESS NOTES
The patient left the office before care was provided and did not complete the visit  due to menstrual cycle; unable to complete pap smear as planned .

## 2024-08-07 ENCOUNTER — OFFICE VISIT (OUTPATIENT)
Dept: FAMILY MEDICINE CLINIC | Facility: CLINIC | Age: 39
End: 2024-08-07
Payer: COMMERCIAL

## 2024-08-07 VITALS
HEIGHT: 60 IN | TEMPERATURE: 98.5 F | WEIGHT: 179 LBS | OXYGEN SATURATION: 99 % | BODY MASS INDEX: 35.14 KG/M2 | HEART RATE: 83 BPM | DIASTOLIC BLOOD PRESSURE: 68 MMHG | SYSTOLIC BLOOD PRESSURE: 90 MMHG

## 2024-08-07 DIAGNOSIS — N89.8 VAGINAL DISCHARGE: ICD-10-CM

## 2024-08-07 DIAGNOSIS — I10 PRIMARY HYPERTENSION: ICD-10-CM

## 2024-08-07 DIAGNOSIS — R42 DIZZINESS ON STANDING: ICD-10-CM

## 2024-08-07 DIAGNOSIS — Z01.419 ENCOUNTER FOR ROUTINE GYNECOLOGICAL EXAMINATION WITH PAPANICOLAOU SMEAR OF CERVIX: Primary | ICD-10-CM

## 2024-08-07 DIAGNOSIS — J30.81 ALLERGY TO CATS: ICD-10-CM

## 2024-08-07 LAB
CANDIDA SPECIES: NEGATIVE
GARDNERELLA VAGINALIS: POSITIVE
T VAGINALIS DNA VAG QL PROBE+SIG AMP: NEGATIVE

## 2024-08-07 PROCEDURE — 87510 GARDNER VAG DNA DIR PROBE: CPT

## 2024-08-07 PROCEDURE — 99214 OFFICE O/P EST MOD 30 MIN: CPT

## 2024-08-07 PROCEDURE — 87660 TRICHOMONAS VAGIN DIR PROBE: CPT

## 2024-08-07 PROCEDURE — G0123 SCREEN CERV/VAG THIN LAYER: HCPCS

## 2024-08-07 PROCEDURE — 99396 PREV VISIT EST AGE 40-64: CPT

## 2024-08-07 PROCEDURE — 87624 HPV HI-RISK TYP POOLED RSLT: CPT

## 2024-08-07 PROCEDURE — 87480 CANDIDA DNA DIR PROBE: CPT

## 2024-08-07 RX ORDER — LISINOPRIL 5 MG/1
5 TABLET ORAL DAILY
Qty: 30 TABLET | Refills: 5 | Status: SHIPPED | OUTPATIENT
Start: 2024-08-07

## 2024-08-07 NOTE — PROGRESS NOTES
"  ENCOUNTER DATE:  2024    Dalia MCLAUGHLIN Gutierrez / 38 y.o. / female      CHIEF COMPLAINT     Gynecologic Exam and Allergies (Pt states she has a cat allergy and recently got a cat. Would like referral to allergist to discuss allergy injections.)      VITALS     Visit Vitals  BP /68 (BP Location: Left arm, Patient Position: Sitting, Cuff Size: Adult)   Pulse 83   Temp 98.5 °F (36.9 °C) (Oral)   Ht 152.4 cm (60\")   Wt 81.2 kg (179 lb)   LMP 2024 (Exact Date)   SpO2 99%   Breastfeeding No   BMI 34.96 kg/m²       BP Readings from Last 3 Encounters:   24 90/68   24 105/72   24 122/85     Wt Readings from Last 3 Encounters:   24 81.2 kg (179 lb)   24 80.7 kg (178 lb)   24 78.1 kg (172 lb 3.2 oz)      Body mass index is 34.96 kg/m².    MEDICATIONS     Current Outpatient Medications on File Prior to Visit   Medication Sig Dispense Refill    albuterol sulfate  (90 Base) MCG/ACT inhaler Inhale 2 puffs Every 4 (Four) Hours As Needed for Wheezing. 18 g 0    famotidine (PEPCID) 20 MG tablet Take 1 tablet by mouth Daily. 90 tablet 1    fluticasone (FLONASE) 50 MCG/ACT nasal spray 2 sprays into the nostril(s) as directed by provider Daily. 18.2 mL 0    gabapentin (NEURONTIN) 400 MG capsule TAKE 1 CAPSULE BY MOUTH THREE TIMES A DAY FOR 30 DAYS      HYDROcodone-acetaminophen (NORCO)  MG per tablet Take 1 tablet by mouth 3 times a day.      levothyroxine (SYNTHROID, LEVOTHROID) 50 MCG tablet Take 1 tablet by mouth Daily. 90 tablet 1    [DISCONTINUED] lisinopril (PRINIVIL,ZESTRIL) 10 MG tablet TAKE 1 TABLET BY MOUTH EVERY DAY 90 tablet 1    [DISCONTINUED] phentermine (ADIPEX-P) 37.5 MG tablet Take 1 tablet by mouth Daily.       No current facility-administered medications on file prior to visit.         HISTORY OF PRESENT ILLNESS     Dalia presents for annual health maintenance visit.    Obstetric History:  OB History          4    Para   4    Term             " "   AB        Living             SAB        IAB        Ectopic        Molar        Multiple        Live Births                   Menstrual History:     Patient's last menstrual period was 08/02/2024 (exact date).         No results found for: \"HPVAPTIMA\"    History of Present Illness  The patient presents for routine well woman exam with Pap smear.  She would also like to discuss her blood pressure as it has been running little bit low since dosage was increased and is requesting referral over to an allergist.    She last visited last week, but due to her menstrual cycle, she was scheduled for this visit. Her menstrual cycles are regular and shorter in duration. She has no history of abnormal Pap smears and declines STD testing. She denies any vaginal discharge, itching, or pain. She has discontinued phentermine after a 3-month break, which she has been receiving from a health and wellness clinic. She is seeking alternatives to phentermine as she feels her body has developed a tolerance to the medication after the first month.     She has experienced low blood pressure during her last few visits to an ENT specialist, Dr. Lopez. She temporarily discontinued her blood pressure medication, but noticed elevated blood pressure at home, leading to resumption of 10 mg dose. She is considering returning to the 5 mg dose. She occasionally experiences dizziness upon standing.    She is considering starting allergy injections for a cat allergy. She is under the care of Dr. Lopez for her thyroid condition and has an appointment scheduled for 12/06/2024. She has a thyroid nodule but has never undergone surgery.  She has a scheduled thyroid ultrasound for December.    Screening mammogram in December 2024 showed abnormality of the left breast.  Diagnostic mammogram and ultrasound revealed focal asymmetry in the upper outer left breast, 0.4 cm cyst.  It was recommended she have a 6-month mammographic follow-up.    SOCIAL " HISTORY  She does not smoke. She drinks alcohol occasionally. She works full-time at the dental office.    FAMILY HISTORY  She has a family history of thyroid cancer.        Last health maintenance visit: more than 3 years ago  General health: good  Lifestyle:  Attempting to lose weight?: Yes   Diet: eats moderately healthy  Exercise: generally stays active (work/exercise)  Tobacco: Never used   Alcohol: occasional/infrequent  Work: Full-time  Reproductive health:  Sexually active?: Yes   Sexual problems?: No problems  Concern for STD?: No    Sees Gynecologist?: No   Jayla/Postmenopausal?: No   Domestic abuse concerns: No   - Depression Screenin/7/2024    10:23 AM   PHQ-2/PHQ-9 Depression Screening   Little Interest or Pleasure in Doing Things 0-->not at all   Feeling Down, Depressed or Hopeless 0-->not at all   PHQ-9: Brief Depression Severity Measure Score 0         PHQ-2: 0 (Not depressed)   PHQ-9: 0 (Negative screening for depression)    Patient Care Team:  Mima Barker APRN as PCP - General (Family Medicine)      ALLERGIES  No Known Allergies     PFSH:     The following portions of the patient's history were reviewed and updated as appropriate: Allergies / Current Medications / Past Medical History / Surgical History / Social History / Family History    PROBLEM LIST   Patient Active Problem List   Diagnosis    Otalgia    Insomnia    Gastroesophageal reflux disease without esophagitis    Hypothyroidism    Chronic right shoulder pain    Class 2 severe obesity due to excess calories with serious comorbidity and body mass index (BMI) of 36.0 to 36.9 in adult    Primary hypertension    Thyroid nodule       PAST MEDICAL HISTORY  Past Medical History:   Diagnosis Date    Acid reflux     Allergic rhinitis     Anxiety     Disease of thyroid gland     Headache     Hypertension     Hypothyroidism     Insomnia     Otalgia     Ovarian cyst        SURGICAL HISTORY  Past Surgical History:   Procedure  Laterality Date    CARPAL TUNNEL RELEASE       SECTION      ECTOPIC PREGNANCY         SOCIAL HISTORY  Social History     Socioeconomic History    Marital status:    Tobacco Use    Smoking status: Never    Smokeless tobacco: Never   Vaping Use    Vaping status: Never Used   Substance and Sexual Activity    Alcohol use: No    Drug use: No    Sexual activity: Yes     Partners: Male     Birth control/protection: Tubal ligation       FAMILY HISTORY  Family History   Problem Relation Age of Onset    Hypertension Mother     Thyroid disease Paternal Grandmother     Diabetes Paternal Grandmother     Breast cancer Paternal Grandmother     Heart disease Paternal Grandfather        IMMUNIZATION HISTORY  Immunization History   Administered Date(s) Administered    COVID-19 (PFIZER) Purple Cap Monovalent 2021, 2021    COVID-19 F23 (PFIZER) 12YRS+ (COMIRNATY) 11/15/2023    Fluzone (or Fluarix & Flulaval for VFC) >6mos 12/15/2017, 2022, 10/02/2023         PHYSICAL EXAMINATION     Physical Exam  Vitals reviewed. Exam conducted with a chaperone present.   Constitutional:       General: She is not in acute distress.     Appearance: Normal appearance. She is well-developed. She is not diaphoretic.   HENT:      Head: Normocephalic and atraumatic. Hair is normal.      Right Ear: Hearing normal. No decreased hearing noted. No drainage.      Left Ear: Hearing normal. No decreased hearing noted.      Nose: Nose normal. No nasal deformity.      Mouth/Throat:      Mouth: Mucous membranes are moist.   Eyes:      General: Lids are normal.      Extraocular Movements: Extraocular movements intact.      Conjunctiva/sclera: Conjunctivae normal.      Pupils: Pupils are equal, round, and reactive to light.   Neck:      Thyroid: No thyromegaly.      Vascular: No JVD.   Cardiovascular:      Rate and Rhythm: Normal rate and regular rhythm.      Pulses: Normal pulses.      Heart sounds: Normal heart sounds.   Pulmonary:       Effort: Pulmonary effort is normal.      Breath sounds: Normal breath sounds.   Chest:   Breasts:     Breasts are symmetrical.      Right: Normal. No mass, nipple discharge, skin change or tenderness.      Left: Normal. No mass, nipple discharge, skin change or tenderness.   Abdominal:      General: Bowel sounds are normal. There is no distension.      Palpations: Abdomen is soft.      Tenderness: There is no abdominal tenderness.   Genitourinary:     General: Normal vulva.      Pubic Area: No rash.       Labia:         Right: No rash or lesion.         Left: No rash or lesion.       Urethra: No urethral swelling.      Vagina: Vaginal discharge present. No lesions.      Cervix: No discharge or cervical bleeding.      Uterus: Normal. Not tender.       Adnexa: Right adnexa normal and left adnexa normal.        Right: No tenderness.          Left: No tenderness.        Rectum: Normal.   Musculoskeletal:         General: No tenderness or deformity. Normal range of motion.      Cervical back: Normal range of motion and neck supple.   Lymphadenopathy:      Cervical: No cervical adenopathy.   Skin:     General: Skin is warm and dry.      Findings: No erythema or rash.   Neurological:      Mental Status: She is alert and oriented to person, place, and time.      Cranial Nerves: No cranial nerve deficit.      Motor: No abnormal muscle tone.      Coordination: Coordination normal.      Deep Tendon Reflexes: Reflexes are normal and symmetric.   Psychiatric:         Mood and Affect: Mood normal.         Behavior: Behavior normal.         Thought Content: Thought content normal.         Judgment: Judgment normal.       Physical Exam  Vital Signs  Vitals show a blood pressure of 90/68.      REVIEWED DATA      Labs:    Lab Results   Component Value Date     04/12/2024    K 3.8 04/12/2024    CALCIUM 9.7 04/12/2024    AST 20 04/12/2024    ALT 23 04/12/2024    BUN 13 04/12/2024    CREATININE 0.60 04/12/2024    CREATININE 0.59  "09/23/2022       Lab Results   Component Value Date    TSH 1.620 04/12/2024    FREET4 1.41 06/20/2023       Lab Results   Component Value Date     (H) 04/12/2024    HDL 45 04/12/2024    TRIG 208 (H) 04/12/2024       No results found for: \"LVAE87YQ\"     Lab Results   Component Value Date    WBC 8.79 04/12/2024    HGB 11.7 (L) 04/12/2024    MCV 81.7 04/12/2024     04/12/2024       Lab Results   Component Value Date    LEUKOCYTESUR Negative 08/05/2022          Lab Results   Component Value Date    HEPCVIRUSABY Non-Reactive 09/23/2022       Results  Imaging  Left breast shows fibroglandular tissue, fibrocystic changes at 2 o'clock position, and a small 0.4 cm cyst.        ASSESSMENT & PLAN     ANNUAL WELLNESS EXAM / PHYSICAL  Diagnoses and all orders for this visit:    1. Encounter for routine gynecological examination with Papanicolaou smear of cervix (Primary)  -     Gardnerella vaginalis, Trichomonas vaginalis, Candida albicans, DNA - Swab, Vagina  -     IgP, Aptima HPV    2. Primary hypertension    3. Dizziness on standing    4. Allergy to cats  -     Ambulatory Referral to Allergy    5. Vaginal discharge  -     Gardnerella vaginalis, Trichomonas vaginalis, Candida albicans, DNA - Swab, Vagina    Other orders  -     lisinopril (PRINIVIL,ZESTRIL) 5 MG tablet; Take 1 tablet by mouth Daily.  Dispense: 30 tablet; Refill: 5      .  Assessment & Plan  1. Health maintenance.  Her last Pap smear was 4 years ago. Routine Pap smear was performed today. A mammogram was ordered to be repeated in December to monitor abnormality.    2. Hypotension.  Her blood pressure is low today, which has been low for the past few visits.  She is also symptomatic with occasional dizziness upon standing.  The dosage of lisinopril will be reduced to 5 mg.    3. Cat allergy.  A referral to [facility] was made for allergy injections.    4. Thyroid nodule.  She was advised to inquire about the type of thyroid cancer her " family members had. A repeat ultrasound is scheduled for 12/2024.    5. Weight management.  GLP-1 agonists may be contraindicated due to family history of thyroid cancer and current thyroid nodule being monitored.  Discussed with patient that with more information and repeat imaging, Wegovy or Zepbound could be considered.    6. Left breast fibrocystic changes.  The left breast has fibrocystic changes and a small 0.4 cm cyst. A 6-month mammographic follow-up was recommended. The cyst will be monitored for another 6 months, and depending on the appearance, a biopsy may be considered. If the cyst remains the same size and has not changed, an annual follow-up will be recommended.      HEALTHCARE MAINTENANCE ISSUES     Cancer Screening:  Colon: Initial/Next screening at age: 43  Repeat colon cancer screening: N/A at this time  Breast: Recommended monthly self exams; annual professional exam  Mammogram: every 1 year  Cervical: 3 years  Skin: Monthly self skin examination, annual exam by health professional      Lifestyle Counseling:  Lifestyle Modifications: Attempt to lose weight, Improve dietary compliance, Increase intensity/regularity of aerobic exercise, Maintain a low sugar/carbohydrate diet, Continue to abstain/curtail drinking, Continue to abstain from smoking, Improve sleep hygiene, and Reduce exposure to stress if possible  Safety Issues: Always wear seatbelt, Avoid texting while driving   Use sunscreen, regular skin examination  Recommended annual dental/vision examination.  Emotional/Stress/Sleep: Reviewed and  given when appropriate    Patient or patient representative verbalized consent for the use of Ambient Listening during the visit with  SUSANNE Cline for chart documentation. 8/7/2024  10:48 EDT

## 2024-08-08 DIAGNOSIS — N76.0 BV (BACTERIAL VAGINOSIS): Primary | ICD-10-CM

## 2024-08-08 DIAGNOSIS — B96.89 BV (BACTERIAL VAGINOSIS): Primary | ICD-10-CM

## 2024-08-08 RX ORDER — METRONIDAZOLE 500 MG/1
500 TABLET ORAL 2 TIMES DAILY
Qty: 14 TABLET | Refills: 0 | Status: SHIPPED | OUTPATIENT
Start: 2024-08-08 | End: 2024-08-15

## 2024-08-09 LAB
CYTOLOGIST CVX/VAG CYTO: NORMAL
CYTOLOGY CVX/VAG DOC CYTO: NORMAL
CYTOLOGY CVX/VAG DOC THIN PREP: NORMAL
DX ICD CODE: NORMAL
HPV I/H RISK 4 DNA CVX QL PROBE+SIG AMP: NEGATIVE
Lab: NORMAL
OTHER STN SPEC: NORMAL
STAT OF ADQ CVX/VAG CYTO-IMP: NORMAL

## 2024-12-06 ENCOUNTER — HOSPITAL ENCOUNTER (OUTPATIENT)
Dept: ULTRASOUND IMAGING | Facility: HOSPITAL | Age: 39
Discharge: HOME | End: 2024-12-06
Admitting: OTOLARYNGOLOGY
Payer: COMMERCIAL

## 2024-12-06 DIAGNOSIS — E04.1 THYROID NODULE: ICD-10-CM

## 2024-12-06 PROCEDURE — 76536 US EXAM OF HEAD AND NECK: CPT

## 2024-12-09 ENCOUNTER — OFFICE VISIT (OUTPATIENT)
Dept: OTOLARYNGOLOGY | Facility: CLINIC | Age: 39
End: 2024-12-09
Payer: COMMERCIAL

## 2024-12-09 VITALS
OXYGEN SATURATION: 98 % | TEMPERATURE: 97.4 F | SYSTOLIC BLOOD PRESSURE: 115 MMHG | DIASTOLIC BLOOD PRESSURE: 71 MMHG | HEART RATE: 94 BPM

## 2024-12-09 DIAGNOSIS — E04.1 THYROID NODULE: Primary | ICD-10-CM

## 2024-12-09 DIAGNOSIS — R59.0 CERVICAL LYMPHADENOPATHY: ICD-10-CM

## 2024-12-09 RX ORDER — OXYCODONE AND ACETAMINOPHEN 10; 325 MG/1; MG/1
1 TABLET ORAL EVERY 8 HOURS PRN
COMMUNITY

## 2024-12-09 RX ORDER — EPINEPHRINE 0.3 MG/.3ML
INJECTION, SOLUTION INTRAMUSCULAR
COMMUNITY
Start: 2024-08-22

## 2024-12-09 RX ORDER — CETIRIZINE HYDROCHLORIDE 10 MG/1
1 TABLET ORAL DAILY
COMMUNITY
Start: 2024-11-21

## 2024-12-09 RX ORDER — MONTELUKAST SODIUM 10 MG/1
10 TABLET ORAL
COMMUNITY
Start: 2024-08-21

## 2024-12-09 RX ORDER — PHENTERMINE HYDROCHLORIDE 37.5 MG/1
TABLET ORAL
COMMUNITY
Start: 2024-10-11

## 2024-12-09 NOTE — PROGRESS NOTES
Patient Name: Dalia Whitley   Visit Date: 12/09/2024   Patient ID: [id number]  Provider: Rolo Lopez MD    Sex: female  Location: [facility]   YOB: 1985  Location Address: [address] [place] [address], [address],?[place]?[address]    Primary Care Provider Mima Barker APRN  Location Phone: [phone]    Referring Provider: No ref. provider found        Chief Complaint  No chief complaint on file.    History of Present Illness  Dalia Whitley is a 38 y.o. female who returns today for follow-up of a thyroid nodule.  She was originally seen by SUSANNE Isabel on 2/2/2023 please see that note for further details.  At that time, she did report a family history of thyroid cancer but denied any personal history of radiation exposure.  Thyroid ultrasound on 12/2/2022 revealed a left inferior 0.5 cm hypoechoic nodule.  Thyroid ultrasound on 7/7/2023 revealed a left 0.4 cm likely cyst.  Thyroid ultrasound on 11/1/2023 again revealed a left inferior 0.4 cm hypoechoic nodule versus cyst.  Thyroid function testing on 6/20/2023 revealed a normal TSH of 2.160 normal free T4 of 1.41.  She denies any compressive symptoms.  1 year follow-up ultrasound was ordered.  Neck ultrasound on 4/15/2024 was unremarkable aside from bilateral nonenlarged lymph nodes which did demonstrate a thickened cortex. Neck ultrasound on 7/15/2024 demonstrated a left 1 x 0.7 cm lymph node with prominent cortex and a right 1.2 x 0.7 cm lymph node with thickened cortex.  These were stable.      She returns today for follow-up having last been seen on 7/22/2024.  She tells me that she recently adopted a cat.  She subsequently underwent allergy testing which revealed strong positive reactivity to weed pollen and positive reactivity to cat, dog, cockroach, mold, tree, grass, and ragweed.  She is currently on immunotherapy as well as using fluticasone.  She feels as though her throat and neck symptoms have  improved.  Thyroid ultrasound on 2024 demonstrated a left 0.5 centimeter hyper and hypoechoic nodule.  Her bilateral lymph nodes appear stable.  Past Medical History:   Diagnosis Date    Acid reflux     Allergic rhinitis     Anxiety     Disease of thyroid gland     Headache     Hypertension     Hypothyroidism     Insomnia     Otalgia     Ovarian cyst        Past Surgical History:   Procedure Laterality Date    CARPAL TUNNEL RELEASE       SECTION      ECTOPIC PREGNANCY           Current Outpatient Medications:     albuterol sulfate  (90 Base) MCG/ACT inhaler, Inhale 2 puffs Every 4 (Four) Hours As Needed for Wheezing., Disp: 18 g, Rfl: 0    Auvi-Q 0.3 MG/0.3ML solution auto-injector injection, , Disp: , Rfl:     cetirizine (zyrTEC) 10 MG tablet, Take 1 tablet by mouth Daily., Disp: , Rfl:     famotidine (PEPCID) 20 MG tablet, Take 1 tablet by mouth Daily., Disp: 90 tablet, Rfl: 1    fluticasone (FLONASE) 50 MCG/ACT nasal spray, 2 sprays into the nostril(s) as directed by provider Daily., Disp: 18.2 mL, Rfl: 0    gabapentin (NEURONTIN) 400 MG capsule, TAKE 1 CAPSULE BY MOUTH THREE TIMES A DAY FOR 30 DAYS, Disp: , Rfl:     levothyroxine (SYNTHROID, LEVOTHROID) 50 MCG tablet, Take 1 tablet by mouth Daily., Disp: 90 tablet, Rfl: 1    lisinopril (PRINIVIL,ZESTRIL) 5 MG tablet, Take 1 tablet by mouth Daily., Disp: 30 tablet, Rfl: 5    montelukast (SINGULAIR) 10 MG tablet, Take 1 tablet by mouth every night at bedtime., Disp: , Rfl:     oxyCODONE-acetaminophen (PERCOCET)  MG per tablet, Take 1 tablet by mouth Every 8 (Eight) Hours As Needed for Severe Pain., Disp: , Rfl:     phentermine (ADIPEX-P) 37.5 MG tablet, , Disp: , Rfl:      No Known Allergies    Social History     Tobacco Use    Smoking status: Never    Smokeless tobacco: Never   Vaping Use    Vaping status: Never Used   Substance Use Topics    Alcohol use: No    Drug use: No        Objective     Vital Signs:   /71   Pulse 94    Temp 97.4 °F (36.3 °C)   SpO2 98%       Physical Exam    General: Well developed, well nourished patient of stated age in no acute distress. Voice is strong and clear.   Head: Normocephalic and atraumatic.  Face: No lesions.  Bilateral parotid and submandibular glands are unremarkable.  Stensen's and Warthin's ducts are productive of clear saliva bilaterally.  House-Brackmann I/VI     bilaterally.   muscles and temporomandibular joint nontender to palpation.  No TMJ crepitus.  Eyes: PERRLA, sclerae anicteric, no conjunctival injection. Extraocular movements are intact and full. No nystagmus.   Ears: Auricles are normal in appearance. Bilateral external auditory canals are unremarkable. Bilateral tympanic membranes are clear and without effusion. Hearing normal to conversational voice.   Nose: External nose is normal in appearance. Bilateral nares are patent with normal appearing mucosa. Septum midline. Turbinates are unremarkable. No lesions.   Oral Cavity: Lips are normal in appearance. Oral mucosa is unremarkable. Gingiva is unremarkable. Normal dentition for age. Tongue is unremarkable with good movement. Hard palate is unremarkable.   Oropharynx: Soft palate is unremarkable with full movement. Uvula is unremarkable. Bilateral tonsils are unremarkable. Posterior oropharynx is unremarkable.    Larynx and hypopharynx: Deferred secondary to gag reflex.  Neck: Supple.  No mass.  Nontender to palpation.  Trachea midline. Thyroid normal size and without nodules to palpation.   Lymphatic: No lymphadenopathy upon palpation.   Psychiatric: Appropriate affect, cooperative   Neurologic: Oriented x 3, strength symmetric in all extremities, Cranial Nerves II-XII are grossly intact to confrontation   Skin: Warm and dry. No rashes.    Procedures           Result Review :               Assessment and Plan    Diagnoses and all orders for this visit:    1. Thyroid nodule (Primary)  -     US Thyroid; Future    2.  Cervical lymphadenopathy            Impressions and findings were discussed at great length.  Currently, her intermittent neck and throat pain and swelling have improved since starting immunotherapy and more aggressive allergy management.  Examination today is unremarkable.  We reviewed and discussed the images from her 12/6/2024 thyroid ultrasound which revealed stable appearing bilateral nonenlarged lymph nodes as well as a stable left 0.5 cm thyroid nodule.  We discussed the pathophysiology and natural history of this condition.  Options for management were discussed and we will continue with close observation with a repeat ultrasound in 1 year or sooner if needed.   She was given ample time to ask questions, all of which were answered to her satisfaction.        Follow Up   Return in about 1 year (around 12/9/2025).  Patient was given instructions and counseling regarding her condition or for health maintenance advice. Please see specific information pulled into the AVS if appropriate.

## 2024-12-11 RX ORDER — LISINOPRIL 5 MG/1
5 TABLET ORAL DAILY
Qty: 30 TABLET | Refills: 5 | Status: SHIPPED | OUTPATIENT
Start: 2024-12-11

## 2024-12-16 ENCOUNTER — HOSPITAL ENCOUNTER (OUTPATIENT)
Dept: ULTRASOUND IMAGING | Facility: HOSPITAL | Age: 39
Discharge: HOME | End: 2024-12-16
Payer: COMMERCIAL

## 2024-12-16 ENCOUNTER — HOSPITAL ENCOUNTER (OUTPATIENT)
Dept: MAMMOGRAPHY | Facility: HOSPITAL | Age: 39
Discharge: HOME | End: 2024-12-16
Payer: COMMERCIAL

## 2024-12-16 DIAGNOSIS — R92.8 ABNORMAL MAMMOGRAM OF LEFT BREAST: ICD-10-CM

## 2024-12-16 DIAGNOSIS — R92.322 SCATTERED FIBROGLANDULAR TISSUE DENSITY OF LEFT BREAST ON MAMMOGRAPHY: ICD-10-CM

## 2024-12-16 PROCEDURE — 77065 DX MAMMO INCL CAD UNI: CPT

## 2024-12-16 PROCEDURE — G0279 TOMOSYNTHESIS, MAMMO: HCPCS

## 2024-12-18 DIAGNOSIS — R92.8 ABNORMAL MAMMOGRAM OF LEFT BREAST: Primary | ICD-10-CM

## 2025-02-04 DIAGNOSIS — E03.9 HYPOTHYROIDISM, UNSPECIFIED TYPE: Chronic | ICD-10-CM

## 2025-02-04 RX ORDER — LEVOTHYROXINE SODIUM 50 UG/1
50 TABLET ORAL DAILY
Qty: 90 TABLET | Refills: 1 | Status: SHIPPED | OUTPATIENT
Start: 2025-02-04

## 2025-02-07 ENCOUNTER — OFFICE VISIT (OUTPATIENT)
Dept: FAMILY MEDICINE CLINIC | Facility: CLINIC | Age: 40
End: 2025-02-07
Payer: COMMERCIAL

## 2025-02-07 VITALS
SYSTOLIC BLOOD PRESSURE: 102 MMHG | DIASTOLIC BLOOD PRESSURE: 64 MMHG | BODY MASS INDEX: 36.95 KG/M2 | TEMPERATURE: 98.7 F | HEIGHT: 60 IN | OXYGEN SATURATION: 97 % | HEART RATE: 83 BPM | WEIGHT: 188.2 LBS

## 2025-02-07 DIAGNOSIS — F40.10 SOCIAL ANXIETY DISORDER: ICD-10-CM

## 2025-02-07 DIAGNOSIS — E66.01 CLASS 2 SEVERE OBESITY DUE TO EXCESS CALORIES WITH SERIOUS COMORBIDITY AND BODY MASS INDEX (BMI) OF 36.0 TO 36.9 IN ADULT: ICD-10-CM

## 2025-02-07 DIAGNOSIS — E66.812 CLASS 2 SEVERE OBESITY DUE TO EXCESS CALORIES WITH SERIOUS COMORBIDITY AND BODY MASS INDEX (BMI) OF 36.0 TO 36.9 IN ADULT: ICD-10-CM

## 2025-02-07 DIAGNOSIS — E04.1 THYROID NODULE: ICD-10-CM

## 2025-02-07 DIAGNOSIS — E03.9 HYPOTHYROIDISM, UNSPECIFIED TYPE: Primary | ICD-10-CM

## 2025-02-07 DIAGNOSIS — I10 PRIMARY HYPERTENSION: ICD-10-CM

## 2025-02-07 DIAGNOSIS — N95.1 VASOMOTOR SYMPTOMS DUE TO MENOPAUSE: ICD-10-CM

## 2025-02-07 PROCEDURE — 99214 OFFICE O/P EST MOD 30 MIN: CPT

## 2025-02-07 RX ORDER — PAROXETINE HYDROCHLORIDE HEMIHYDRATE 12.5 MG/1
12.5 TABLET, FILM COATED, EXTENDED RELEASE ORAL EVERY MORNING
Qty: 30 TABLET | Refills: 2 | Status: SHIPPED | OUTPATIENT
Start: 2025-02-07

## 2025-02-07 RX ORDER — SEMAGLUTIDE 0.25 MG/.5ML
0.25 INJECTION, SOLUTION SUBCUTANEOUS WEEKLY
Qty: 2 ML | Refills: 0 | Status: SHIPPED | OUTPATIENT
Start: 2025-02-07

## 2025-02-07 NOTE — PROGRESS NOTES
Chief Complaint  Chief Complaint   Patient presents with    Hypothyroidism    Hypertension    Anxiety     Pt states when she is at a grocery store around lots of people, she starts to sweat and shakes.    Night Sweats    Obesity     Pt would like to discuss options for weight loss       Subjective      Dalia Whitley presents to [facility]  History of Present Illness  The patient is a 39-year-old female who presents today for follow-up on hypertension, hypothyroidism, anxiety, and weight management.    She reports experiencing perspiration and anxiety when in public settings, but not at home or during sleep. She also experiences occasional restlessness, particularly in large crowds. Her menstrual cycles are regular. She has previously been prescribed anti-anxiety medication during her pregnancy, which she discontinued postpartum due to perceived symptom resolution. 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    She has a history of weight loss medication use, specifically phentermine, which she initially found beneficial but later deemed ineffective. She is aware that her insurance covers Wegovy. She has enrolled in a weight loss program offered through her workplace's new insurance plan, which includes weekly online activities and coaching sessions.    She is currently on a low dose of lisinopril for her hypertension. During a recent doctor's appointment 2 weeks ago, her blood pressure was recorded at 112.    She is under the care of Dr. Lopez for monitoring of her thyroid nodule, which has remained stable. She is scheduled for annual checkups with Dr. Lopez.      The 10-year ASCVD risk score (Angel STEVENSON, et al., 2019) is: 0.8%    Values used to calculate the score:      Age: 39 years      Sex: Female      Is Non- : No      Diabetic: No      Tobacco smoker: No      Systolic Blood Pressure:  102 mmHg      Is BP treated: Yes      HDL Cholesterol: 45 mg/dL      Total Cholesterol: 203 mg/dL      Objective     Medical History:  Past Medical History:   Diagnosis Date    Acid reflux     Allergic rhinitis     Anxiety     Disease of thyroid gland     Headache     Hypertension     Hypothyroidism     Insomnia     Otalgia     Ovarian cyst      Past Surgical History:   Procedure Laterality Date    CARPAL TUNNEL RELEASE       SECTION      ECTOPIC PREGNANCY      TUBAL ABDOMINAL LIGATION  2019      Social History     Tobacco Use    Smoking status: Never    Smokeless tobacco: Never   Vaping Use    Vaping status: Never Used   Substance Use Topics    Alcohol use: No    Drug use: No     Family History   Problem Relation Age of Onset    Hypertension Mother     Thyroid disease Paternal Grandmother     Diabetes Paternal Grandmother     Breast cancer Paternal Grandmother     Heart disease Paternal Grandfather        Medications:  Prior to Admission medications    Medication Sig Start Date End Date Taking? Authorizing Provider   albuterol sulfate  (90 Base) MCG/ACT inhaler Inhale 2 puffs Every 4 (Four) Hours As Needed for Wheezing. 23  Yes Jeromy Chowdary PA   Auvi-Q 0.3 MG/0.3ML solution auto-injector injection  24  Yes Beto Stiles MD   cetirizine (zyrTEC) 10 MG tablet Take 1 tablet by mouth Daily. 24  Yes Beto Stiles MD   famotidine (PEPCID) 20 MG tablet Take 1 tablet by mouth Daily. 6/3/24  Yes Mima Barker APRN   fluticasone (FLONASE) 50 MCG/ACT nasal spray 2 sprays into the nostril(s) as directed by provider Daily. 23  Yes Jeromy Chowdary PA   gabapentin (NEURONTIN) 400 MG capsule TAKE 1 CAPSULE BY MOUTH THREE TIMES A DAY FOR 30 DAYS 23  Yes Beto Stiles MD   levothyroxine (SYNTHROID, LEVOTHROID) 50 MCG tablet TAKE 1 TABLET BY MOUTH DAILY 25  Yes Mima Barker APRN   lisinopril (PRINIVIL,ZESTRIL) 5 MG tablet TAKE 1  "TABLET BY MOUTH DAILY 12/11/24  Yes Mj Mima Rios, SUSANNE   montelukast (SINGULAIR) 10 MG tablet Take 1 tablet by mouth every night at bedtime. 8/21/24  Yes ProviderBeto MD   oxyCODONE-acetaminophen (PERCOCET)  MG per tablet Take 1 tablet by mouth Every 8 (Eight) Hours As Needed for Severe Pain.   Yes Provider, MD Beto   phentermine (ADIPEX-P) 37.5 MG tablet  10/11/24   Provider, Beto, MD        Allergies:   Patient has no known allergies.    Health Maintenance Due   Topic Date Due    TDAP/TD VACCINES (1 - Tdap) Never done         Vital Signs:   /64 (BP Location: Left arm, Patient Position: Sitting, Cuff Size: Adult)   Pulse 83   Temp 98.7 °F (37.1 °C) (Oral)   Ht 152.4 cm (60\")   Wt 85.4 kg (188 lb 3.2 oz)   SpO2 97%   BMI 36.76 kg/m²     Wt Readings from Last 3 Encounters:   02/07/25 85.4 kg (188 lb 3.2 oz)   08/07/24 81.2 kg (179 lb)   07/22/24 80.7 kg (178 lb)     BP Readings from Last 3 Encounters:   02/07/25 102/64   12/09/24 115/71   08/07/24 90/68     Physical Exam  Vitals reviewed.   Constitutional:       Appearance: Normal appearance. She is well-developed. She is obese.   HENT:      Head: Normocephalic and atraumatic.   Eyes:      Conjunctiva/sclera: Conjunctivae normal.      Pupils: Pupils are equal, round, and reactive to light.   Cardiovascular:      Rate and Rhythm: Normal rate and regular rhythm.      Heart sounds: No murmur heard.     No friction rub. No gallop.   Pulmonary:      Effort: Pulmonary effort is normal.      Breath sounds: Normal breath sounds. No wheezing or rhonchi.   Abdominal:      General: Bowel sounds are normal. There is no distension.      Palpations: Abdomen is soft.      Tenderness: There is no abdominal tenderness.   Skin:     General: Skin is warm and dry.   Neurological:      Mental Status: She is alert and oriented to person, place, and time.      Cranial Nerves: No cranial nerve deficit.   Psychiatric:         Mood and Affect: " Mood and affect normal.         Behavior: Behavior normal.         Thought Content: Thought content normal.         Judgment: Judgment normal.       Physical Exam  Lungs were auscultated.  Heart was examined.      Result Review :    The following data was reviewed by SUSANNE Cline on 02/07/25 at 11:14 EST:    Common labs          4/12/2024    15:09   Common Labs   Glucose 85    BUN 13    Creatinine 0.60    Sodium 138    Potassium 3.8    Chloride 102    Calcium 9.7    Albumin 4.3    Total Bilirubin 0.2    Alkaline Phosphatase 67    AST (SGOT) 20    ALT (SGPT) 23    WBC 8.79    Hemoglobin 11.7    Hematocrit 35.7    Platelets 418    Total Cholesterol 203    Triglycerides 208    HDL Cholesterol 45    LDL Cholesterol  121        US Thyroid    Result Date: 12/10/2024  Impression: Stable 4 mm nodule in the inferior left thyroid lobe is long-term stability and based on TI-RADS criteria no further follow-up for this is needed. No new or suspicious nodules are demonstrated. TI-RADS: TR2 - Not Suspicious. No follow up needed. Electronically Signed: Fredrick Castro DO  12/10/2024 11:39 AM EST  Workstation ID: [id number]      Results  Laboratory Studies  Thyroid labs done in April were normal.               Assessment and Plan    Diagnoses and all orders for this visit:    1. Hypothyroidism, unspecified type (Primary)    2. Primary hypertension    3. Thyroid nodule    4. Class 2 severe obesity due to excess calories with serious comorbidity and body mass index (BMI) of 36.0 to 36.9 in adult    5. Social anxiety disorder  -     PARoxetine CR (Paxil CR) 12.5 MG 24 hr tablet; Take 1 tablet by mouth Every Morning.  Dispense: 30 tablet; Refill: 2    6. Vasomotor symptoms due to menopause  -     Semaglutide-Weight Management (Wegovy) 0.25 MG/0.5ML solution auto-injector; Inject 0.5 mL under the skin into the appropriate area as directed 1 (One) Time Per Week.  Dispense: 2 mL; Refill: 0       Assessment & Plan  1.  Anxiety.  Her symptoms suggest a potential diagnosis of social anxiety disorder. She experiences sweating and anxiety primarily when in public or large groups. She also reports night sweats, which may be hormonal. Paxil (paroxetine), an SSRI, was recommended as it can help with both social anxiety and vasomotor symptoms associated with menopause. She was advised to research Paxil and decide if she wants to start the medication. A prescription for Paxil will be sent to her pharmacy.    2. Hypertension.  Her blood pressure is well-controlled on the current low dose of lisinopril. She will continue her current regimen of lisinopril.    3. Hypothyroidism.  Her thyroid nodule, monitored by Dr. Lopez, has shown a slight reduction in size. Previous thyroid labs from April were normal. She will continue annual checkups with Dr. Lopez.    4. Weight management.  She has been enrolled in a weight loss program and is following a diet and exercise regimen. Wegovy (semaglutide) was discussed as a potential treatment option. She was advised to maintain a diet rich in high-fiber fruits and vegetables, while limiting intake of breads and pastas. Adequate hydration was also emphasized. In case of constipation, MiraLAX was recommended. A prescription for Wegovy will be initiated, starting at a low dose of 0.25 mg once weekly for a month. The dose will be increased monthly if tolerated. If she experiences significant nausea or gastrointestinal symptoms, the dose will be maintained for a longer period. Prior authorization for Wegovy will be pursued.    Follow-up  The patient will follow up after 04/12/2025 for her annual physical examination, at which time her response to Wegovy and Paxil will be assessed.          Smoking Cessation:    Dalia ARNOLDO Whitley  reports that she has never smoked. She has never used smokeless tobacco.            Follow Up   Return in about 10 weeks (around 4/18/2025) for Next scheduled follow up, Annual  physical.  Patient was given instructions and counseling regarding her condition or for health maintenance advice. Please see specific information pulled into the AVS if appropriate.     Please note that portions of this note were completed with a voice recognition program.    Patient or patient representative verbalized consent for the use of Ambient Listening during the visit with  SUSANNE Cline for chart documentation. 2/7/2025  11:14 EST

## 2025-02-11 ENCOUNTER — PRIOR AUTHORIZATION (OUTPATIENT)
Dept: FAMILY MEDICINE CLINIC | Facility: CLINIC | Age: 40
End: 2025-02-11
Payer: COMMERCIAL

## 2025-02-11 NOTE — TELEPHONE ENCOUNTER
Wegovy 0.25MG/0.5ML auto-injectors [place] APPROVAL     Your request has been approved  Your request was approved based on the initial information provided at the time of the coverage request submission. Please allow additional time for the final decision to be made and added to the patient's account.

## 2025-03-07 DIAGNOSIS — N95.1 VASOMOTOR SYMPTOMS DUE TO MENOPAUSE: ICD-10-CM

## 2025-03-07 RX ORDER — SEMAGLUTIDE 0.25 MG/.5ML
INJECTION, SOLUTION SUBCUTANEOUS
Qty: 2 ML | Refills: 0 | Status: SHIPPED | OUTPATIENT
Start: 2025-03-07

## 2025-03-27 RX ORDER — SEMAGLUTIDE 0.5 MG/.5ML
0.5 INJECTION, SOLUTION SUBCUTANEOUS WEEKLY
Qty: 2 ML | Refills: 0 | Status: SHIPPED | OUTPATIENT
Start: 2025-03-27

## 2025-04-22 ENCOUNTER — OFFICE VISIT (OUTPATIENT)
Dept: FAMILY MEDICINE CLINIC | Facility: CLINIC | Age: 40
End: 2025-04-22
Payer: COMMERCIAL

## 2025-04-22 VITALS
DIASTOLIC BLOOD PRESSURE: 72 MMHG | TEMPERATURE: 98.3 F | HEART RATE: 78 BPM | BODY MASS INDEX: 36.57 KG/M2 | HEIGHT: 60 IN | OXYGEN SATURATION: 98 % | WEIGHT: 186.3 LBS | SYSTOLIC BLOOD PRESSURE: 102 MMHG

## 2025-04-22 DIAGNOSIS — E66.01 CLASS 2 SEVERE OBESITY DUE TO EXCESS CALORIES WITH SERIOUS COMORBIDITY AND BODY MASS INDEX (BMI) OF 36.0 TO 36.9 IN ADULT: ICD-10-CM

## 2025-04-22 DIAGNOSIS — G47.00 INSOMNIA, UNSPECIFIED TYPE: ICD-10-CM

## 2025-04-22 DIAGNOSIS — E04.1 THYROID NODULE: ICD-10-CM

## 2025-04-22 DIAGNOSIS — Z00.00 ANNUAL PHYSICAL EXAM: ICD-10-CM

## 2025-04-22 DIAGNOSIS — E66.812 CLASS 2 SEVERE OBESITY DUE TO EXCESS CALORIES WITH SERIOUS COMORBIDITY AND BODY MASS INDEX (BMI) OF 36.0 TO 36.9 IN ADULT: ICD-10-CM

## 2025-04-22 DIAGNOSIS — R53.83 OTHER FATIGUE: ICD-10-CM

## 2025-04-22 DIAGNOSIS — E03.9 HYPOTHYROIDISM, UNSPECIFIED TYPE: Primary | ICD-10-CM

## 2025-04-22 DIAGNOSIS — K21.9 GASTROESOPHAGEAL REFLUX DISEASE WITHOUT ESOPHAGITIS: ICD-10-CM

## 2025-04-22 DIAGNOSIS — I10 PRIMARY HYPERTENSION: ICD-10-CM

## 2025-04-22 LAB
25(OH)D3 SERPL-MCNC: 21.9 NG/ML (ref 30–100)
ALBUMIN SERPL-MCNC: 4.5 G/DL (ref 3.5–5.2)
ALBUMIN/GLOB SERPL: 1.7 G/DL
ALP SERPL-CCNC: 73 U/L (ref 39–117)
ALT SERPL W P-5'-P-CCNC: 36 U/L (ref 1–33)
ANION GAP SERPL CALCULATED.3IONS-SCNC: 10 MMOL/L (ref 5–15)
AST SERPL-CCNC: 27 U/L (ref 1–32)
BASOPHILS # BLD MANUAL: 0 10*3/MM3 (ref 0–0.2)
BASOPHILS NFR BLD MANUAL: 0 % (ref 0–1.5)
BILIRUB SERPL-MCNC: 0.3 MG/DL (ref 0–1.2)
BUN SERPL-MCNC: 15 MG/DL (ref 6–20)
BUN/CREAT SERPL: 28.8 (ref 7–25)
CALCIUM SPEC-SCNC: 9.4 MG/DL (ref 8.6–10.5)
CHLORIDE SERPL-SCNC: 105 MMOL/L (ref 98–107)
CHOLEST SERPL-MCNC: 230 MG/DL (ref 0–200)
CO2 SERPL-SCNC: 22 MMOL/L (ref 22–29)
CREAT SERPL-MCNC: 0.52 MG/DL (ref 0.57–1)
DEPRECATED RDW RBC AUTO: 42.5 FL (ref 37–54)
EGFRCR SERPLBLD CKD-EPI 2021: 119.9 ML/MIN/1.73
EOSINOPHIL # BLD MANUAL: 0.07 10*3/MM3 (ref 0–0.4)
EOSINOPHIL NFR BLD MANUAL: 1 % (ref 0.3–6.2)
ERYTHROCYTE [DISTWIDTH] IN BLOOD BY AUTOMATED COUNT: 14.1 % (ref 12.3–15.4)
FERRITIN SERPL-MCNC: 103 NG/ML (ref 13–150)
FOLATE SERPL-MCNC: 15.5 NG/ML (ref 4.78–24.2)
GLOBULIN UR ELPH-MCNC: 2.6 GM/DL
GLUCOSE SERPL-MCNC: 84 MG/DL (ref 65–99)
HCT VFR BLD AUTO: 36.9 % (ref 34–46.6)
HDLC SERPL-MCNC: 38 MG/DL (ref 40–60)
HGB BLD-MCNC: 12.2 G/DL (ref 12–15.9)
IRON 24H UR-MRATE: 69 MCG/DL (ref 37–145)
IRON SATN MFR SERPL: 17 % (ref 20–50)
LDLC SERPL CALC-MCNC: 168 MG/DL (ref 0–100)
LDLC/HDLC SERPL: 4.36 {RATIO}
LYMPHOCYTES # BLD MANUAL: 2.91 10*3/MM3 (ref 0.7–3.1)
LYMPHOCYTES NFR BLD MANUAL: 9 % (ref 5–12)
MCH RBC QN AUTO: 27.7 PG (ref 26.6–33)
MCHC RBC AUTO-ENTMCNC: 33.1 G/DL (ref 31.5–35.7)
MCV RBC AUTO: 83.9 FL (ref 79–97)
MONOCYTES # BLD: 0.65 10*3/MM3 (ref 0.1–0.9)
NEUTROPHILS # BLD AUTO: 3.64 10*3/MM3 (ref 1.7–7)
NEUTROPHILS NFR BLD MANUAL: 50 % (ref 42.7–76)
PLAT MORPH BLD: NORMAL
PLATELET # BLD AUTO: 411 10*3/MM3 (ref 140–450)
PMV BLD AUTO: 10.1 FL (ref 6–12)
POTASSIUM SERPL-SCNC: 3.9 MMOL/L (ref 3.5–5.2)
PROT SERPL-MCNC: 7.1 G/DL (ref 6–8.5)
RBC # BLD AUTO: 4.4 10*6/MM3 (ref 3.77–5.28)
RBC MORPH BLD: NORMAL
SODIUM SERPL-SCNC: 137 MMOL/L (ref 136–145)
T4 FREE SERPL-MCNC: 1.23 NG/DL (ref 0.92–1.68)
TIBC SERPL-MCNC: 401 MCG/DL (ref 298–536)
TRANSFERRIN SERPL-MCNC: 269 MG/DL (ref 200–360)
TRIGL SERPL-MCNC: 132 MG/DL (ref 0–150)
TSH SERPL DL<=0.05 MIU/L-ACNC: 2.53 UIU/ML (ref 0.27–4.2)
VARIANT LYMPHS NFR BLD MANUAL: 40 % (ref 19.6–45.3)
VIT B12 BLD-MCNC: 445 PG/ML (ref 211–946)
VLDLC SERPL-MCNC: 24 MG/DL (ref 5–40)
WBC MORPH BLD: NORMAL
WBC NRBC COR # BLD AUTO: 7.27 10*3/MM3 (ref 3.4–10.8)

## 2025-04-22 PROCEDURE — 84439 ASSAY OF FREE THYROXINE: CPT

## 2025-04-22 PROCEDURE — 85007 BL SMEAR W/DIFF WBC COUNT: CPT

## 2025-04-22 PROCEDURE — 82746 ASSAY OF FOLIC ACID SERUM: CPT

## 2025-04-22 PROCEDURE — 82306 VITAMIN D 25 HYDROXY: CPT

## 2025-04-22 PROCEDURE — 80061 LIPID PANEL: CPT

## 2025-04-22 PROCEDURE — 82607 VITAMIN B-12: CPT

## 2025-04-22 PROCEDURE — 82728 ASSAY OF FERRITIN: CPT

## 2025-04-22 PROCEDURE — 83540 ASSAY OF IRON: CPT

## 2025-04-22 PROCEDURE — 80050 GENERAL HEALTH PANEL: CPT

## 2025-04-22 PROCEDURE — 84466 ASSAY OF TRANSFERRIN: CPT

## 2025-04-22 NOTE — PROGRESS NOTES
Chief Complaint  Chief Complaint   Patient presents with    Annual Exam       Subjective      Dalia Whitley presents to [facility]  History of Present Illness  The patient presents for her annual exam. She has a history of hypertension, which is well-managed with lisinopril. Blood pressure is monitored at home, with systolic readings ranging from 102 to 120. A thyroid nodule is monitored annually by an ENT specialist. The most recent evaluation in 12/2024 showed no changes, and a follow-up is scheduled for 12/2025. Fatigue, dry hands, hair loss, and thirst are reported. Synthroid is taken every morning. A history of hyperlipidemia is noted, but no medication is currently taken for this condition. Borderline anemia was diagnosed during her last pregnancy, with no subsequent updates provided. Paxil is taken for anxiety, which is well-controlled. More good days than bad are reported, with no issues with this medication. A brachial plexus injury in the shoulder is managed with injections and ablation therapy, though constant pain persists. She is under the care of a pain management specialist, seen every 4 weeks. Percocet is taken for pain relief, and gabapentin is taken at night due to drowsiness. Severe allergies are managed with a nasal spray. Previous allergy injections, administered twice weekly, significantly improved seasonal allergies. Singulair was discontinued due to side effects, including insomnia. GERD is managed with Pepcid. Insomnia due to Singulair has resolved, and sleep has improved. Albuterol was prescribed for a respiratory infection and is used as needed.        Objective     Medical History:  Past Medical History:   Diagnosis Date    Acid reflux     Allergic rhinitis     Anxiety     Disease of thyroid gland     Dizziness 01/10/2023    Headache     Hypertension     Hypothyroidism     Insomnia     Otalgia     Ovarian cyst      Past Surgical History:   Procedure  Laterality Date    CARPAL TUNNEL RELEASE       SECTION      ECTOPIC PREGNANCY      TUBAL ABDOMINAL LIGATION  2019      Social History     Tobacco Use    Smoking status: Never    Smokeless tobacco: Never   Vaping Use    Vaping status: Never Used   Substance Use Topics    Alcohol use: No    Drug use: No     Family History   Problem Relation Age of Onset    Hypertension Mother     Thyroid disease Paternal Grandmother     Diabetes Paternal Grandmother     Breast cancer Paternal Grandmother     Heart disease Paternal Grandfather        Medications:  Prior to Admission medications    Medication Sig Start Date End Date Taking? Authorizing Provider   albuterol sulfate  (90 Base) MCG/ACT inhaler Inhale 2 puffs Every 4 (Four) Hours As Needed for Wheezing. 23  Yes Jeromy Chowdary PA   Auvi-Q 0.3 MG/0.3ML solution auto-injector injection  24  Yes Beto Stiles MD   cetirizine (zyrTEC) 10 MG tablet Take 1 tablet by mouth Daily. 24  Yes Beto Stiles MD   famotidine (PEPCID) 20 MG tablet Take 1 tablet by mouth Daily. 6/3/24  Yes Mima Barker APRN   fluticasone (FLONASE) 50 MCG/ACT nasal spray 2 sprays into the nostril(s) as directed by provider Daily. 23  Yes Jermoy Chowdary PA   gabapentin (NEURONTIN) 400 MG capsule TAKE 1 CAPSULE BY MOUTH THREE TIMES A DAY FOR 30 DAYS 23  Yes Beto Stiles MD   levothyroxine (SYNTHROID, LEVOTHROID) 50 MCG tablet TAKE 1 TABLET BY MOUTH DAILY 25  Yes Mima Barker APRN   lisinopril (PRINIVIL,ZESTRIL) 5 MG tablet TAKE 1 TABLET BY MOUTH DAILY 24  Yes Mima Barker APRN   montelukast (SINGULAIR) 10 MG tablet Take 1 tablet by mouth every night at bedtime. 24  Yes Beto Stiles MD   oxyCODONE-acetaminophen (PERCOCET)  MG per tablet Take 1 tablet by mouth Every 8 (Eight) Hours As Needed for Severe Pain.   Yes Beto Stiles MD   PARoxetine CR (Paxil CR) 12.5 MG 24  "hr tablet Take 1 tablet by mouth Every Morning. 2/7/25  Yes Mima Barker APRN   Semaglutide-Weight Management (Wegovy) 0.5 MG/0.5ML solution auto-injector Inject 0.5 mL under the skin into the appropriate area as directed 1 (One) Time Per Week. 3/27/25  Yes Mima Barker APRN        Allergies:   Patient has no known allergies.    Health Maintenance Due   Topic Date Due    TDAP/TD VACCINES (1 - Tdap) Never done    ANNUAL PHYSICAL  04/12/2025       Vital Signs:   /72 (BP Location: Left arm, Patient Position: Sitting, Cuff Size: Adult)   Pulse 78   Temp 98.3 °F (36.8 °C) (Oral)   Ht 152.4 cm (60\")   Wt 84.5 kg (186 lb 4.8 oz)   SpO2 98%   BMI 36.38 kg/m²     Wt Readings from Last 3 Encounters:   04/22/25 84.5 kg (186 lb 4.8 oz)   02/07/25 85.4 kg (188 lb 3.2 oz)   08/07/24 81.2 kg (179 lb)     BP Readings from Last 3 Encounters:   04/22/25 102/72   02/07/25 102/64   12/09/24 115/71        Physical Exam  Vitals reviewed.   Constitutional:       Appearance: Normal appearance. She is well-developed.   HENT:      Head: Normocephalic and atraumatic.   Eyes:      Conjunctiva/sclera: Conjunctivae normal.      Pupils: Pupils are equal, round, and reactive to light.   Cardiovascular:      Rate and Rhythm: Normal rate and regular rhythm.      Heart sounds: No murmur heard.     No friction rub. No gallop.   Pulmonary:      Effort: Pulmonary effort is normal.      Breath sounds: Normal breath sounds. No wheezing or rhonchi.   Abdominal:      General: Bowel sounds are normal. There is no distension.      Palpations: Abdomen is soft.      Tenderness: There is no abdominal tenderness.   Musculoskeletal:      Right shoulder: Decreased range of motion. Decreased strength.   Skin:     General: Skin is warm and dry.   Neurological:      Mental Status: She is alert and oriented to person, place, and time.      Cranial Nerves: No cranial nerve deficit.   Psychiatric:         Mood and Affect: Mood and affect " normal.         Behavior: Behavior normal.         Thought Content: Thought content normal.         Judgment: Judgment normal.       Physical Exam  Respiratory: Clear to auscultation, no wheezing, rales or rhonchi  Cardiovascular: Regular rate and rhythm, no murmurs, rubs, or gallops    Result Review :    The following data was reviewed by SUSANNE Cline on 04/22/25 at 09:03 EDT:        No Images in the past 120 days found..    Results  Labs from 2024:   - CMP: Normal   - Hemoglobin: 11.7 g/dL   - Thyroid levels: 1.620       Assessment and Plan    Diagnoses and all orders for this visit:    1. Hypothyroidism, unspecified type (Primary)  -     TSH+Free T4    2. Thyroid nodule  -     TSH+Free T4    3. Primary hypertension    4. Annual physical exam  -     CBC With Manual Differential  -     Comprehensive Metabolic Panel  -     Lipid Panel  -     TSH+Free T4    5. Gastroesophageal reflux disease without esophagitis    6. Insomnia, unspecified type    7. Class 2 severe obesity due to excess calories with serious comorbidity and body mass index (BMI) of 36.0 to 36.9 in adult    8. Other fatigue  -     TSH+Free T4  -     Iron Profile  -     Ferritin  -     Vitamin D,25-Hydroxy  -     Vitamin B12 & Folate      Assessment & Plan  1. Annual examination.  - Her cholesterol levels were elevated a year ago, indicating hyperlipidemia. The last comprehensive metabolic panel (CMP) conducted in 2024 showed normal results, including glucose, creatinine, and kidney levels.  - Her hemoglobin level was 11.7, which is stable but on the lower end of the normal range. Her thyroid levels were within the normal range at 1.620, suggesting effective medication management.  - She reports symptoms of fatigue, dry hands, hair loss, and thirst, which could be related to her thyroid condition. She is currently on Wegovy 0.5 mg and reports improved appetite control compared to the 0.25 mg dose. She has been on the 0.25 mg dose for 2  months.  - A fasting lab will be conducted today to assess her current health status.    2. Anxiety.  - She is taking Paxil for anxiety, which she reports as being well-controlled.  - No problems reported with Paxil, and she is having more good days than bad.  - Discussed her anxiety management and confirmed that she feels better on Paxil.  - Continue current Paxil dosage and monitor for any changes in symptoms.    3. Brachial plexus injury.  - She has a brachial plexus injury in her right shoulder and is under the care of a pain management specialist, whom she sees every 4 weeks.  - She is on Percocet for pain relief and gabapentin at night due to its drowsiness-inducing effect.  - Discussed the potential side effects of Percocet, including constipation, and recommended MiraLAX if needed.  - Continue current pain management regimen and monitor for any changes in pain or side effects.    4. Allergies and GERD.  - She has severe allergies and uses a nasal spray for symptom management. She has previously received allergy injections twice weekly, which have significantly improved her seasonal allergies.  - She discontinued Singulair due to side effects, including insomnia. She has GERD and takes Pepcid for symptom control.  - Discussed the importance of sitting up after eating to prevent GERD symptoms and the potential use of Zyrtec or Claritin for allergy management.  - Continue current allergy and GERD management, and monitor for any changes in symptoms.    5. Obesity  -Currently, she is on Wegovy 0.5 mg and reports improved symptoms compared to the 0.25 mg dose. The third injection from the 0.5 mg pack is planned for tomorrow. A significant difference in appetite is noted, with reduced hunger and the ability to fast until the next day after eating lunch. Adequate hydration is maintained, and no constipation is reported. Bloating experienced on the 0.25 mg dose was managed with MiraLAX.    6. Hypertension  -She is  taking Lisinopril daily for her hypertension. Blood pressure is monitored at home, with systolic readings ranging from 102 to 120.           Smoking Cessation:    Dalia Whitley  reports that she has never smoked. She has never used smokeless tobacco. I have educated her on the risk of diseases from using tobacco products such as cancer, COPD, and heart disease.     I spent 3  minutes counseling the patient.      Follow Up   Return in about 3 months (around 7/22/2025) for Next scheduled follow up.  Patient was given instructions and counseling regarding her condition or for health maintenance advice. Please see specific information pulled into the AVS if appropriate.     Please note that portions of this note were completed with a voice recognition program.    Patient or patient representative verbalized consent for the use of Ambient Listening during the visit with  SUSANNE Cline for chart documentation. 4/22/2025  08:50 EDT

## 2025-04-24 DIAGNOSIS — E55.9 VITAMIN D DEFICIENCY: Primary | ICD-10-CM

## 2025-04-24 DIAGNOSIS — E78.2 MIXED HYPERLIPIDEMIA: ICD-10-CM

## 2025-04-24 RX ORDER — ATORVASTATIN CALCIUM 10 MG/1
10 TABLET, FILM COATED ORAL DAILY
Qty: 90 TABLET | Refills: 1 | Status: SHIPPED | OUTPATIENT
Start: 2025-04-24

## 2025-04-24 RX ORDER — ERGOCALCIFEROL 1.25 MG/1
50000 CAPSULE, LIQUID FILLED ORAL WEEKLY
Qty: 5 CAPSULE | Refills: 10 | Status: SHIPPED | OUTPATIENT
Start: 2025-04-24

## 2025-04-29 RX ORDER — SEMAGLUTIDE 1 MG/.5ML
1 INJECTION, SOLUTION SUBCUTANEOUS WEEKLY
Qty: 2 ML | Refills: 0 | Status: SHIPPED | OUTPATIENT
Start: 2025-04-29

## 2025-05-11 DIAGNOSIS — F40.10 SOCIAL ANXIETY DISORDER: ICD-10-CM

## 2025-05-12 RX ORDER — PAROXETINE HYDROCHLORIDE HEMIHYDRATE 12.5 MG/1
12.5 TABLET, FILM COATED, EXTENDED RELEASE ORAL EVERY MORNING
Qty: 30 TABLET | Refills: 2 | Status: SHIPPED | OUTPATIENT
Start: 2025-05-12

## 2025-05-22 RX ORDER — SEMAGLUTIDE 1.7 MG/.75ML
1.7 INJECTION, SOLUTION SUBCUTANEOUS WEEKLY
Qty: 3 ML | Refills: 0 | Status: SHIPPED | OUTPATIENT
Start: 2025-05-22

## 2025-06-16 ENCOUNTER — HOSPITAL ENCOUNTER (OUTPATIENT)
Dept: MAMMOGRAPHY | Facility: HOSPITAL | Age: 40
Discharge: HOME | End: 2025-06-16
Payer: COMMERCIAL

## 2025-06-16 DIAGNOSIS — R92.8 ABNORMAL MAMMOGRAM OF LEFT BREAST: ICD-10-CM

## 2025-06-16 PROCEDURE — G0279 TOMOSYNTHESIS, MAMMO: HCPCS

## 2025-06-16 PROCEDURE — 77066 DX MAMMO INCL CAD BI: CPT

## 2025-06-23 RX ORDER — SEMAGLUTIDE 2.4 MG/.75ML
2.4 INJECTION, SOLUTION SUBCUTANEOUS WEEKLY
Qty: 3 ML | Refills: 2 | Status: SHIPPED | OUTPATIENT
Start: 2025-06-23

## 2025-07-08 RX ORDER — LISINOPRIL 5 MG/1
5 TABLET ORAL DAILY
Qty: 30 TABLET | Refills: 5 | Status: SHIPPED | OUTPATIENT
Start: 2025-07-08 | End: 2025-07-11 | Stop reason: SDUPTHER

## 2025-07-11 ENCOUNTER — OFFICE VISIT (OUTPATIENT)
Dept: FAMILY MEDICINE CLINIC | Facility: CLINIC | Age: 40
End: 2025-07-11
Payer: COMMERCIAL

## 2025-07-11 VITALS
SYSTOLIC BLOOD PRESSURE: 110 MMHG | WEIGHT: 178.6 LBS | TEMPERATURE: 98.4 F | OXYGEN SATURATION: 99 % | BODY MASS INDEX: 35.06 KG/M2 | HEART RATE: 82 BPM | DIASTOLIC BLOOD PRESSURE: 72 MMHG | HEIGHT: 60 IN

## 2025-07-11 DIAGNOSIS — E66.812 CLASS 2 SEVERE OBESITY DUE TO EXCESS CALORIES WITH SERIOUS COMORBIDITY AND BODY MASS INDEX (BMI) OF 36.0 TO 36.9 IN ADULT: ICD-10-CM

## 2025-07-11 DIAGNOSIS — I10 PRIMARY HYPERTENSION: ICD-10-CM

## 2025-07-11 DIAGNOSIS — H65.193 ACUTE MIDDLE EAR EFFUSION, BILATERAL: ICD-10-CM

## 2025-07-11 DIAGNOSIS — E66.01 CLASS 2 SEVERE OBESITY DUE TO EXCESS CALORIES WITH SERIOUS COMORBIDITY AND BODY MASS INDEX (BMI) OF 36.0 TO 36.9 IN ADULT: ICD-10-CM

## 2025-07-11 DIAGNOSIS — Z98.890 HISTORY OF CARPAL TUNNEL SURGERY OF RIGHT WRIST: ICD-10-CM

## 2025-07-11 DIAGNOSIS — G56.02 CARPAL TUNNEL SYNDROME OF LEFT WRIST: Primary | ICD-10-CM

## 2025-07-11 PROCEDURE — 99214 OFFICE O/P EST MOD 30 MIN: CPT

## 2025-07-11 RX ORDER — LISINOPRIL 5 MG/1
5 TABLET ORAL DAILY
Qty: 90 TABLET | Refills: 1 | Status: SHIPPED | OUTPATIENT
Start: 2025-07-11

## 2025-07-11 RX ORDER — HYDROCODONE BITARTRATE AND ACETAMINOPHEN 10; 325 MG/1; MG/1
1 TABLET ORAL 3 TIMES DAILY
COMMUNITY
Start: 2025-07-07

## 2025-07-11 NOTE — PROGRESS NOTES
Chief Complaint  Chief Complaint   Patient presents with    Hand Pain     Left hand x 2 weeks    Wrist Pain     Left wrist x 2 weeks. Pt has history of carpal tunnel surgery on right wrist. She was supposed to have it on her left hand but never did.    Earache     Pt states she has pain in her left ear and it is also sensitive to the touch x 1 week.       Subjective      Dalia Whitley presents to [facility]  History of Present Illness  The patient presents for evaluation of left hand pain, ear pain.    She is experiencing pain in her left hand, particularly around the thumb area, which intensifies when she  objects such as her purse. The pain tends to worsen towards the end of the day, likely due to her frequent typing at work. In the mornings, she experiences numbness and tingling in her hands, followed by a sharp pain located just below her thumb. Initially, she attributed this discomfort to an awkward sleeping position. She plans to purchase a carpal tunnel brace for her left hand from [facility] today to see if it provides any relief. She is currently taking oxycodone for shoulder pain, which also seems to alleviate her hand pain. She has previously undergone carpal tunnel surgery on her right hand, performed by a doctor in [place], which was successful in relieving her symptoms.    She has been experiencing ear pain for the past few days, accompanied by sensitivity below the ear. She has also noticed sinus drainage and tender lymph nodes. She does not report any illness or congestion. She is currently taking Claritin for allergies.    She is currently on a maintenance dose of 2.4 mg and has been at this dosage for one month.    She is requesting a refill of her lisinopril prescription.    PAST SURGICAL HISTORY:  Carpal tunnel surgery on the right hand.      Objective     Medical History:  Past Medical History:   Diagnosis Date    Acid reflux     Allergic rhinitis      Anxiety     Disease of thyroid gland     Dizziness 01/10/2023    Headache     Hypertension     Hypothyroidism     Insomnia     Otalgia     Ovarian cyst      Past Surgical History:   Procedure Laterality Date    CARPAL TUNNEL RELEASE       SECTION      ECTOPIC PREGNANCY      TUBAL ABDOMINAL LIGATION  2019      Social History     Tobacco Use    Smoking status: Never    Smokeless tobacco: Never   Vaping Use    Vaping status: Never Used   Substance Use Topics    Alcohol use: No    Drug use: No     Family History   Problem Relation Age of Onset    Hypertension Mother     Thyroid disease Paternal Grandmother     Diabetes Paternal Grandmother     Breast cancer Paternal Grandmother     Heart disease Paternal Grandfather        Medications:  Prior to Admission medications    Medication Sig Start Date End Date Taking? Authorizing Provider   albuterol sulfate  (90 Base) MCG/ACT inhaler Inhale 2 puffs Every 4 (Four) Hours As Needed for Wheezing. 23  Yes Jeromy Chowdary PA-C   atorvastatin (Lipitor) 10 MG tablet Take 1 tablet by mouth Daily. 25  Yes Mima Barker APRN   Auvi-Q 0.3 MG/0.3ML solution auto-injector injection  24  Yes Beto Stiles MD   cetirizine (zyrTEC) 10 MG tablet Take 1 tablet by mouth Daily. 24  Yes Beto Stiles MD   famotidine (PEPCID) 20 MG tablet Take 1 tablet by mouth Daily. 6/3/24  Yes Mima Barker APRN   fluticasone (FLONASE) 50 MCG/ACT nasal spray 2 sprays into the nostril(s) as directed by provider Daily. 23  Yes Jeromy Chowdary PA-C   gabapentin (NEURONTIN) 400 MG capsule TAKE 1 CAPSULE BY MOUTH THREE TIMES A DAY FOR 30 DAYS 23  Yes Beto Stiles MD   HYDROcodone-acetaminophen (NORCO)  MG per tablet Take 1 tablet by mouth 3 times a day. 25  Yes Beto Stiles MD   levothyroxine (SYNTHROID, LEVOTHROID) 50 MCG tablet TAKE 1 TABLET BY MOUTH DAILY 25  Yes Mima Barker APRN  "  lisinopril (PRINIVIL,ZESTRIL) 5 MG tablet Take 1 tablet by mouth Daily. 7/11/25  Yes Mima Barker APRN   oxyCODONE-acetaminophen (PERCOCET)  MG per tablet Take 1 tablet by mouth Every 8 (Eight) Hours As Needed for Severe Pain.   Yes Provider, MD Beto   PARoxetine CR (PAXIL-CR) 12.5 MG 24 hr tablet TAKE 1 TABLET BY MOUTH EVERY MORNING 5/12/25  Yes Mima Barker APRN   Semaglutide-Weight Management (Wegovy) 2.4 MG/0.75ML solution auto-injector Inject 0.75 mL under the skin into the appropriate area as directed 1 (One) Time Per Week. 6/23/25  Yes Mima Barker APRN   vitamin D (ERGOCALCIFEROL) 1.25 MG (44357 UT) capsule capsule Take 1 capsule by mouth 1 (One) Time Per Week. 4/24/25  Yes Mima Barker APRN   lisinopril (PRINIVIL,ZESTRIL) 5 MG tablet TAKE 1 TABLET BY MOUTH DAILY 7/8/25 7/11/25 Yes Mima Barker APRN        Allergies:   Patient has no known allergies.    Health Maintenance Due   Topic Date Due    TDAP/TD VACCINES (1 - Tdap) Never done         Vital Signs:   /72 (BP Location: Left arm, Patient Position: Sitting, Cuff Size: Adult)   Pulse 82   Temp 98.4 °F (36.9 °C) (Oral)   Ht 152.4 cm (60\")   Wt 81 kg (178 lb 9.6 oz)   SpO2 99%   BMI 34.88 kg/m²     Wt Readings from Last 3 Encounters:   07/11/25 81 kg (178 lb 9.6 oz)   04/22/25 84.5 kg (186 lb 4.8 oz)   02/07/25 85.4 kg (188 lb 3.2 oz)     BP Readings from Last 3 Encounters:   07/11/25 110/72   04/22/25 102/72   02/07/25 102/64     Physical Exam  Vitals reviewed.   Constitutional:       Appearance: Normal appearance. She is well-developed.   HENT:      Head: Normocephalic and atraumatic.      Right Ear: A middle ear effusion is present. Tympanic membrane is not erythematous or bulging.      Left Ear: Tenderness present. A middle ear effusion is present. Tympanic membrane is not erythematous or bulging.   Eyes:      Conjunctiva/sclera: Conjunctivae normal.      Pupils: Pupils are " equal, round, and reactive to light.   Cardiovascular:      Rate and Rhythm: Normal rate and regular rhythm.      Heart sounds: No murmur heard.     No friction rub. No gallop.   Pulmonary:      Effort: Pulmonary effort is normal.      Breath sounds: Normal breath sounds. No wheezing or rhonchi.   Abdominal:      General: Bowel sounds are normal. There is no distension.      Palpations: Abdomen is soft.      Tenderness: There is no abdominal tenderness.   Lymphadenopathy:      Cervical: No cervical adenopathy.   Skin:     General: Skin is warm and dry.   Neurological:      Mental Status: She is alert and oriented to person, place, and time.      Cranial Nerves: No cranial nerve deficit.   Psychiatric:         Mood and Affect: Mood and affect normal.         Behavior: Behavior normal.         Thought Content: Thought content normal.         Judgment: Judgment normal.       Physical Exam  Ears: Fluid present behind both eardrums  Neck: Tenderness below the left ear      Result Review :    The following data was reviewed by SUSANNE Cline on 07/11/25 at 10:42 EDT:    Common labs          4/22/2025    09:16   Common Labs   Glucose 84    BUN 15    Creatinine 0.52    Sodium 137    Potassium 3.9    Chloride 105    Calcium 9.4    Albumin 4.5    Total Bilirubin 0.3    Alkaline Phosphatase 73    AST (SGOT) 27    ALT (SGPT) 36    WBC 7.27    Hemoglobin 12.2    Hematocrit 36.9    Platelets 411    Total Cholesterol 230    Triglycerides 132    HDL Cholesterol 38    LDL Cholesterol  168        Mammo Diagnostic Digital Tomosynthesis Bilateral With CAD  Result Date: 6/16/2025   IMPRESSION: Stable benign bilateral mammogram. No evidence for malignancy. Clinical follow-up and routine screening mammography are recommended.    TISSUE DENSITY:  The breasts are heterogeneously dense, which may obscure small masses. The patient will receive a letter regarding breast density.  BI-RADS ASSESSMENT: BI-RADS 1. Negative.    Note:  It has been reported that there is approximately a 15% false negative in mammography. Therefore, management of a palpable abnormality should not be deferred because of a negative mammogram.    6/16/2025 9:59 AM by Benjamín Escamilla MD on Workstation: [id number]        Results                 Assessment and Plan    Diagnoses and all orders for this visit:    1. Carpal tunnel syndrome of left wrist (Primary)  -     Ambulatory Referral to Orthopedic Surgery    2. History of carpal tunnel surgery of right wrist    3. Primary hypertension    4. Acute middle ear effusion, bilateral    5. Class 2 severe obesity due to excess calories with serious comorbidity and body mass index (BMI) of 36.0 to 36.9 in adult    Other orders  -     lisinopril (PRINIVIL,ZESTRIL) 5 MG tablet; Take 1 tablet by mouth Daily.  Dispense: 90 tablet; Refill: 1       Assessment & Plan  1. Left hand pain.  - Symptoms include sharp pain and numbness, particularly below the thumb, exacerbated by typing and other activities.  - Previous carpal tunnel surgery on the right hand was successful.  - Referral to a local orthopedic specialist for further evaluation and potential surgery.  - Advised to use a carpal tunnel brace during sleep and possibly during work hours. For breakthrough pain, ibuprofen is recommended.    2. Ear pain.  - Significant fluid accumulation behind both eardrums, causing discomfort and tenderness.  - Physical examination confirms fluid presence and sinus drainage.  - Recommended to take over-the-counter Allegra-D, Claritin-D, or Mucinex D twice daily for 5 to 7 days to alleviate pressure and discomfort.    3. Weight management.  - Patient has lost 10 pounds over the past 5 months.  - Currently on the highest maintenance dose of 2.4 mg for weight management.  - Continued weight loss is expected with the current regimen.    4. Blood pressure management.  - Blood pressure is well-controlled on the current regimen.  - Refill for lisinopril 5 mg  has been provided.          Smoking Cessation:    Dalia Whitley  reports that she has never smoked. She has never used smokeless tobacco.             Follow Up   Return in about 3 months (around 10/11/2025) for Next scheduled follow up.  Patient was given instructions and counseling regarding her condition or for health maintenance advice. Please see specific information pulled into the AVS if appropriate.     Please note that portions of this note were completed with a voice recognition program.    Patient or patient representative verbalized consent for the use of Ambient Listening during the visit with  SUSANNE Cline for chart documentation. 7/11/2025  10:42 EDT

## 2025-08-16 DIAGNOSIS — E03.9 HYPOTHYROIDISM, UNSPECIFIED TYPE: Chronic | ICD-10-CM

## 2025-08-18 ENCOUNTER — OFFICE VISIT (OUTPATIENT)
Dept: ORTHOPEDIC SURGERY | Facility: CLINIC | Age: 40
End: 2025-08-18
Payer: COMMERCIAL

## 2025-08-18 VITALS
SYSTOLIC BLOOD PRESSURE: 103 MMHG | OXYGEN SATURATION: 98 % | WEIGHT: 172 LBS | DIASTOLIC BLOOD PRESSURE: 74 MMHG | BODY MASS INDEX: 33.77 KG/M2 | HEART RATE: 71 BPM | HEIGHT: 60 IN

## 2025-08-18 DIAGNOSIS — R20.0 NUMBNESS OF LEFT HAND: Primary | ICD-10-CM

## 2025-08-18 RX ORDER — LEVOTHYROXINE SODIUM 50 UG/1
50 TABLET ORAL DAILY
Qty: 90 TABLET | Refills: 1 | Status: SHIPPED | OUTPATIENT
Start: 2025-08-18

## 2025-08-19 ENCOUNTER — PATIENT ROUNDING (BHMG ONLY) (OUTPATIENT)
Dept: ORTHOPEDIC SURGERY | Facility: CLINIC | Age: 40
End: 2025-08-19
Payer: COMMERCIAL